# Patient Record
Sex: FEMALE | Race: OTHER | NOT HISPANIC OR LATINO
[De-identification: names, ages, dates, MRNs, and addresses within clinical notes are randomized per-mention and may not be internally consistent; named-entity substitution may affect disease eponyms.]

---

## 2017-02-25 ENCOUNTER — RX RENEWAL (OUTPATIENT)
Age: 82
End: 2017-02-25

## 2017-05-05 ENCOUNTER — APPOINTMENT (OUTPATIENT)
Dept: NEPHROLOGY | Facility: CLINIC | Age: 82
End: 2017-05-05

## 2017-05-05 VITALS — DIASTOLIC BLOOD PRESSURE: 76 MMHG | WEIGHT: 180 LBS | SYSTOLIC BLOOD PRESSURE: 142 MMHG

## 2017-07-12 ENCOUNTER — RX RENEWAL (OUTPATIENT)
Age: 82
End: 2017-07-12

## 2017-11-01 ENCOUNTER — APPOINTMENT (OUTPATIENT)
Dept: NEPHROLOGY | Facility: CLINIC | Age: 82
End: 2017-11-01
Payer: MEDICARE

## 2017-11-01 VITALS — SYSTOLIC BLOOD PRESSURE: 168 MMHG | DIASTOLIC BLOOD PRESSURE: 85 MMHG | RESPIRATION RATE: 14 BRPM | HEART RATE: 80 BPM

## 2017-11-01 VITALS — DIASTOLIC BLOOD PRESSURE: 80 MMHG | SYSTOLIC BLOOD PRESSURE: 148 MMHG

## 2017-11-01 DIAGNOSIS — Z86.39 PERSONAL HISTORY OF OTHER ENDOCRINE, NUTRITIONAL AND METABOLIC DISEASE: ICD-10-CM

## 2017-11-01 PROCEDURE — 99214 OFFICE O/P EST MOD 30 MIN: CPT

## 2017-11-15 ENCOUNTER — APPOINTMENT (OUTPATIENT)
Dept: NEPHROLOGY | Facility: CLINIC | Age: 82
End: 2017-11-15
Payer: MEDICARE

## 2017-11-15 VITALS — DIASTOLIC BLOOD PRESSURE: 78 MMHG | SYSTOLIC BLOOD PRESSURE: 138 MMHG | HEART RATE: 80 BPM

## 2017-11-15 PROCEDURE — 99214 OFFICE O/P EST MOD 30 MIN: CPT

## 2017-11-15 RX ORDER — MOMETASONE FUROATE 1 MG/G
0.1 CREAM TOPICAL
Refills: 0 | Status: ACTIVE | COMMUNITY

## 2018-04-03 ENCOUNTER — RX RENEWAL (OUTPATIENT)
Age: 83
End: 2018-04-03

## 2018-05-21 ENCOUNTER — APPOINTMENT (OUTPATIENT)
Dept: NEPHROLOGY | Facility: CLINIC | Age: 83
End: 2018-05-21
Payer: MEDICARE

## 2018-05-21 VITALS — SYSTOLIC BLOOD PRESSURE: 140 MMHG | HEART RATE: 72 BPM | DIASTOLIC BLOOD PRESSURE: 78 MMHG

## 2018-05-21 DIAGNOSIS — N18.3 CHRONIC KIDNEY DISEASE, STAGE 3 (MODERATE): ICD-10-CM

## 2018-05-21 PROCEDURE — 99214 OFFICE O/P EST MOD 30 MIN: CPT

## 2018-11-14 ENCOUNTER — APPOINTMENT (OUTPATIENT)
Dept: NEPHROLOGY | Facility: CLINIC | Age: 83
End: 2018-11-14
Payer: MEDICARE

## 2018-11-14 VITALS — SYSTOLIC BLOOD PRESSURE: 136 MMHG | DIASTOLIC BLOOD PRESSURE: 78 MMHG | HEART RATE: 68 BPM

## 2018-11-14 PROCEDURE — 99214 OFFICE O/P EST MOD 30 MIN: CPT

## 2019-02-19 ENCOUNTER — RX RENEWAL (OUTPATIENT)
Age: 84
End: 2019-02-19

## 2019-04-24 ENCOUNTER — RX RENEWAL (OUTPATIENT)
Age: 84
End: 2019-04-24

## 2019-05-22 ENCOUNTER — APPOINTMENT (OUTPATIENT)
Dept: NEPHROLOGY | Facility: CLINIC | Age: 84
End: 2019-05-22
Payer: MEDICARE

## 2019-05-22 VITALS — DIASTOLIC BLOOD PRESSURE: 60 MMHG | SYSTOLIC BLOOD PRESSURE: 138 MMHG | HEART RATE: 62 BPM

## 2019-05-22 PROCEDURE — 99214 OFFICE O/P EST MOD 30 MIN: CPT

## 2019-05-22 RX ORDER — CHOLECALCIFEROL (VITAMIN D3) 50 MCG
50 MCG TABLET ORAL
Refills: 0 | Status: ACTIVE | COMMUNITY

## 2019-05-23 NOTE — ASSESSMENT
[FreeTextEntry1] : all lab data was reviewed with patient in detail from 5/20/2019\par 82 yo woman with HTN, DMII, MGUS and CKD 4 with non nephrotic range proteinuria.\par --CKD 4-  creat stable range at 2.21\par electrolytes and volume status good\par no uremic sx, maintaining low protein diet\par -hyperkalemia-  K- 5; controlled on low K diet\par -- Microscopic hematuria and proteinuria - cont to monitor, prior,  refused biopsy  creat stabilized\par --proteinuria- Uprot/creat 2359; c/w cozaar\par --HTN - BP acceptable-\par -- DMT2-- A1C good but trending up - at 7.1%- reduce CHO intake- lose weight-\par -hyperlipidemia with elevated triglycerides- as above reduce CHO intact- lose weight- add agents to reduce if remains elevated next OV\par --MGUS- faint band IgG Kappa- stable--\par -secondary hyperparathyroidism-  PTH- 75 acceptable, Ca, P good; c/w Vit D\par \par f/u 3-4 months\par \par \par

## 2019-05-23 NOTE — PHYSICAL EXAM
[General Appearance - Alert] : alert [General Appearance - In No Acute Distress] : in no acute distress [Sclera] : the sclera and conjunctiva were normal [PERRL With Normal Accommodation] : pupils were equal in size, round, and reactive to light [Extraocular Movements] : extraocular movements were intact [Outer Ear] : the ears and nose were normal in appearance [Hearing Threshold Finger Rub Not [place]] : hearing was normal [Both Tympanic Membranes Were Examined] : both tympanic membranes were normal [Neck Appearance] : the appearance of the neck was normal [] : no respiratory distress [Respiration, Rhythm And Depth] : normal respiratory rhythm and effort [Auscultation Breath Sounds / Voice Sounds] : lungs were clear to auscultation bilaterally [Heart Rate And Rhythm] : heart rate was normal and rhythm regular [Heart Sounds] : normal S1 and S2 [Heart Sounds Gallop] : no gallops [Murmurs] : no murmurs [Heart Sounds Pericardial Friction Rub] : no pericardial rub [Full Pulse] : the pedal pulses are present [Edema] : there was no peripheral edema [Cervical Lymph Nodes Enlarged Posterior Bilaterally] : posterior cervical [Cervical Lymph Nodes Enlarged Anterior Bilaterally] : anterior cervical [Supraclavicular Lymph Nodes Enlarged Bilaterally] : supraclavicular [No CVA Tenderness] : no ~M costovertebral angle tenderness [No Spinal Tenderness] : no spinal tenderness [Abnormal Walk] : normal gait [Involuntary Movements] : no involuntary movements were seen [Musculoskeletal - Swelling] : no joint swelling seen [No Focal Deficits] : no focal deficits [Oriented To Time, Place, And Person] : oriented to person, place, and time [Impaired Insight] : insight and judgment were intact [FreeTextEntry1] : L upper quarter of face with darker pigmented non raised scars from prior herpes zoster outbreak, no open lesions

## 2019-05-23 NOTE — HISTORY OF PRESENT ILLNESS
[FreeTextEntry1] : 84 yo woman with CKD 4,  HTN, DMT2, MGUS and non nephrotic range proteinuria:here for follow up evaluation.\par No new medical events\par continues on crestor\par No flank pain, dysuria, hematuria or frothy urine\par No SOB, CP, N, V\par  no NSAIDS,\par \par

## 2019-11-06 ENCOUNTER — RECORD ABSTRACTING (OUTPATIENT)
Age: 84
End: 2019-11-06

## 2019-11-11 LAB
24R-OH-CALCIDIOL SERPL-MCNC: 17.9 PG/ML
25(OH)D3 SERPL-MCNC: 34.2 NG/ML
ALBUMIN SERPL ELPH-MCNC: 4.2 G/DL
ALP BLD-CCNC: 73 U/L
ALT SERPL-CCNC: 6 U/L
ANION GAP SERPL CALC-SCNC: 15 MMOL/L
APPEARANCE: CLEAR
AST SERPL-CCNC: 9 U/L
BACTERIA: NEGATIVE
BASOPHILS # BLD AUTO: 0.03 K/UL
BASOPHILS NFR BLD AUTO: 0.5 %
BILIRUB SERPL-MCNC: 0.3 MG/DL
BILIRUBIN URINE: NEGATIVE
BLOOD URINE: NEGATIVE
BUN SERPL-MCNC: 43 MG/DL
CALCIUM SERPL-MCNC: 10.1 MG/DL
CALCIUM SERPL-MCNC: 10.1 MG/DL
CHLORIDE SERPL-SCNC: 105 MMOL/L
CHOLEST SERPL-MCNC: 148 MG/DL
CHOLEST/HDLC SERPL: 3.6 RATIO
CO2 SERPL-SCNC: 24 MMOL/L
COLOR: NORMAL
CREAT SERPL-MCNC: 2.18 MG/DL
CREAT SPEC-SCNC: 72 MG/DL
CREAT SPEC-SCNC: 72 MG/DL
CREAT/PROT UR: 2 RATIO
EOSINOPHIL # BLD AUTO: 0.28 K/UL
EOSINOPHIL NFR BLD AUTO: 4.4 %
ESTIMATED AVERAGE GLUCOSE: 148 MG/DL
GLUCOSE QUALITATIVE U: NEGATIVE
GLUCOSE SERPL-MCNC: 151 MG/DL
HBA1C MFR BLD HPLC: 6.8 %
HCT VFR BLD CALC: 38.6 %
HDLC SERPL-MCNC: 41 MG/DL
HGB BLD-MCNC: 11.6 G/DL
HYALINE CASTS: 0 /LPF
IMM GRANULOCYTES NFR BLD AUTO: 0.3 %
KETONES URINE: NEGATIVE
LDLC SERPL CALC-MCNC: 74 MG/DL
LEUKOCYTE ESTERASE URINE: NEGATIVE
LYMPHOCYTES # BLD AUTO: 1.71 K/UL
LYMPHOCYTES NFR BLD AUTO: 26.9 %
MAGNESIUM SERPL-MCNC: 2.1 MG/DL
MAN DIFF?: NORMAL
MCHC RBC-ENTMCNC: 28.8 PG
MCHC RBC-ENTMCNC: 30.1 GM/DL
MCV RBC AUTO: 95.8 FL
MICROALBUMIN 24H UR DL<=1MG/L-MCNC: 90.8 MG/DL
MICROALBUMIN/CREAT 24H UR-RTO: 1224 MG/G
MICROSCOPIC-UA: NORMAL
MONOCYTES # BLD AUTO: 0.58 K/UL
MONOCYTES NFR BLD AUTO: 9.1 %
NEUTROPHILS # BLD AUTO: 3.74 K/UL
NEUTROPHILS NFR BLD AUTO: 58.8 %
NITRITE URINE: NEGATIVE
PARATHYROID HORMONE INTACT: 54 PG/ML
PH URINE: 6.5
PHOSPHATE SERPL-MCNC: 4.5 MG/DL
PLATELET # BLD AUTO: 353 K/UL
POTASSIUM SERPL-SCNC: 5.2 MMOL/L
PROT SERPL-MCNC: 7.3 G/DL
PROT UR-MCNC: 147 MG/DL
PROTEIN URINE: ABNORMAL
RBC # BLD: 4.03 M/UL
RBC # FLD: 12.7 %
RED BLOOD CELLS URINE: 3 /HPF
SODIUM SERPL-SCNC: 143 MMOL/L
SPECIFIC GRAVITY URINE: 1.01
SQUAMOUS EPITHELIAL CELLS: 1 /HPF
TRIGL SERPL-MCNC: 167 MG/DL
URATE SERPL-MCNC: 5.7 MG/DL
UROBILINOGEN URINE: NORMAL
WBC # FLD AUTO: 6.36 K/UL
WHITE BLOOD CELLS URINE: 2 /HPF

## 2019-11-13 ENCOUNTER — APPOINTMENT (OUTPATIENT)
Dept: NEPHROLOGY | Facility: CLINIC | Age: 84
End: 2019-11-13
Payer: MEDICARE

## 2019-11-13 VITALS — SYSTOLIC BLOOD PRESSURE: 142 MMHG | DIASTOLIC BLOOD PRESSURE: 76 MMHG | HEART RATE: 70 BPM

## 2019-11-13 PROCEDURE — 99214 OFFICE O/P EST MOD 30 MIN: CPT

## 2019-11-15 NOTE — PHYSICAL EXAM
[General Appearance - Alert] : alert [PERRL With Normal Accommodation] : pupils were equal in size, round, and reactive to light [Sclera] : the sclera and conjunctiva were normal [General Appearance - In No Acute Distress] : in no acute distress [Extraocular Movements] : extraocular movements were intact [Outer Ear] : the ears and nose were normal in appearance [Hearing Threshold Finger Rub Not [place]] : hearing was normal [Both Tympanic Membranes Were Examined] : both tympanic membranes were normal [Neck Appearance] : the appearance of the neck was normal [] : no respiratory distress [Respiration, Rhythm And Depth] : normal respiratory rhythm and effort [Auscultation Breath Sounds / Voice Sounds] : lungs were clear to auscultation bilaterally [Heart Sounds] : normal S1 and S2 [Heart Rate And Rhythm] : heart rate was normal and rhythm regular [Murmurs] : no murmurs [Heart Sounds Gallop] : no gallops [Heart Sounds Pericardial Friction Rub] : no pericardial rub [Full Pulse] : the pedal pulses are present [Cervical Lymph Nodes Enlarged Posterior Bilaterally] : posterior cervical [Edema] : there was no peripheral edema [Cervical Lymph Nodes Enlarged Anterior Bilaterally] : anterior cervical [Supraclavicular Lymph Nodes Enlarged Bilaterally] : supraclavicular [No Spinal Tenderness] : no spinal tenderness [No CVA Tenderness] : no ~M costovertebral angle tenderness [Abnormal Walk] : normal gait [Musculoskeletal - Swelling] : no joint swelling seen [Involuntary Movements] : no involuntary movements were seen [No Focal Deficits] : no focal deficits [Oriented To Time, Place, And Person] : oriented to person, place, and time [Impaired Insight] : insight and judgment were intact [FreeTextEntry1] : L upper quarter of face with darker pigmented non raised scars from prior herpes zoster outbreak, no open lesions

## 2019-11-15 NOTE — HISTORY OF PRESENT ILLNESS
[FreeTextEntry1] : 85 yo woman here for f/u evaluation of CKD 4,  HTN, DMT2, MGUS and non nephrotic range proteinuria.\par Just back from [place] 2 weeks ago- \par has developed some GI reflux\par tolerating crestor\par No flank pain, dysuria, hematuria or frothy urine\par No SOB, CP, N, V\par  no NSAIDS,\par \par

## 2019-11-15 NOTE — ASSESSMENT
[FreeTextEntry1] : all lab data was reviewed with patient in detail from 11/9/2019\par 84 yo woman with HTN, DMII, MGUS and CKD 4 with non nephrotic range proteinuria.\par --CKD 4-  creat 2.18- generally stable\par no uremic sx, maintaining low protein diet\par -hyperkalemia-  K- 5.2; if rises will add veltassa. c/w low K diet\par -- Microscopic hematuria- repeat u/a without blood \par --proteinuria- Uprot/creat 2- stable ; c/w cozaar\par --HTN - BP acceptable-\par -- DMT2-- A1C down to 6.8%- good-\par -hyperlipidemia  improving- c/w crestor\par - MGUS- faint band IgG Kappa- stable--\par -secondary hyperparathyroidism- controlled with Vit D-   PTH-  54 from 75 \par \par f/u 3-4 months\par \par \par

## 2020-04-06 ENCOUNTER — RX RENEWAL (OUTPATIENT)
Age: 85
End: 2020-04-06

## 2020-04-10 ENCOUNTER — RX RENEWAL (OUTPATIENT)
Age: 85
End: 2020-04-10

## 2020-10-01 ENCOUNTER — APPOINTMENT (OUTPATIENT)
Dept: NEPHROLOGY | Facility: CLINIC | Age: 85
End: 2020-10-01
Payer: MEDICARE

## 2020-10-01 PROCEDURE — 99214 OFFICE O/P EST MOD 30 MIN: CPT

## 2020-10-01 RX ORDER — MULTIVITAMIN
TABLET ORAL
Refills: 0 | Status: ACTIVE | COMMUNITY

## 2020-10-01 RX ORDER — CALCITRIOL 0.25 UG/1
0.25 CAPSULE, LIQUID FILLED ORAL
Qty: 90 | Refills: 3 | Status: DISCONTINUED | COMMUNITY
Start: 2019-11-13 | End: 2020-10-01

## 2020-10-02 NOTE — PHYSICAL EXAM
[General Appearance - Alert] : alert [General Appearance - In No Acute Distress] : in no acute distress [Sclera] : the sclera and conjunctiva were normal [PERRL With Normal Accommodation] : pupils were equal in size, round, and reactive to light [Extraocular Movements] : extraocular movements were intact [Outer Ear] : the ears and nose were normal in appearance [Hearing Threshold Finger Rub Not [place]] : hearing was normal [Both Tympanic Membranes Were Examined] : both tympanic membranes were normal [Neck Appearance] : the appearance of the neck was normal [] : no respiratory distress [Respiration, Rhythm And Depth] : normal respiratory rhythm and effort [Auscultation Breath Sounds / Voice Sounds] : lungs were clear to auscultation bilaterally [Heart Rate And Rhythm] : heart rate was normal and rhythm regular [Heart Sounds] : normal S1 and S2 [Heart Sounds Gallop] : no gallops [Murmurs] : no murmurs [Heart Sounds Pericardial Friction Rub] : no pericardial rub [Full Pulse] : the pedal pulses are present [Edema] : there was no peripheral edema [Cervical Lymph Nodes Enlarged Posterior Bilaterally] : posterior cervical [Cervical Lymph Nodes Enlarged Anterior Bilaterally] : anterior cervical [Supraclavicular Lymph Nodes Enlarged Bilaterally] : supraclavicular [No CVA Tenderness] : no ~M costovertebral angle tenderness [No Spinal Tenderness] : no spinal tenderness [Abnormal Walk] : normal gait [Involuntary Movements] : no involuntary movements were seen [Musculoskeletal - Swelling] : no joint swelling seen [No Focal Deficits] : no focal deficits [Oriented To Time, Place, And Person] : oriented to person, place, and time [Impaired Insight] : insight and judgment were intact [FreeTextEntry1] : L upper quarter of face with darker pigmented non raised scars from prior herpes zoster outbreak, no open lesions

## 2020-10-02 NOTE — HISTORY OF PRESENT ILLNESS
[FreeTextEntry1] : 86 yo woman with CKD 4,  HTN, DMT2, MGUS and non nephrotic range proteinuria, here for f/u evaluation.\par Was not able to get to [facility] due to COVID pandemic\par feels somewhat stressed but coping\par no change to medications.\par Denies flank pain, dysuria, hematuria or frothy urine \par No CP, SOB, \par reflux symptoms settled down\par tolerating crestor\par \par \par \par

## 2020-10-02 NOTE — ASSESSMENT
[FreeTextEntry1] : all lab data was reviewed with patient in detail from 9/28/2020\par 86 yo woman with HTN, DM2, MGUS and CKD 4 with non nephrotic range proteinuria.\par --CKD 4-  creat 2.47- up again.\par no uremic sx, maintaining low protein diet;\par No NSAID or PPI\par trial of lower losartan and change atenolol to carvedilol - titrate as needed\par -hyperkalemia-  diet controlled- K 4.6- defer potassium lowering agent. c/w low K diet\par -- Microscopic hematuria- repeat u/a without blood monitor\par --proteinuria- Uprot/creat subnephrotic, stable ;  as above will need to follow proteinuria trend on lower dose ARB\par --HTN - BP above goal- adjusting meds as above\par -- DMT2-- A1C stable at 6.8%\par -hyperlipidemia- c/w crestor\par - MGUS- faint band IgG Kappa- stable--\par -secondary hyperparathyroidism- controlled with Vit D\par \par f/u 3 months\par \par \par

## 2020-10-12 ENCOUNTER — APPOINTMENT (OUTPATIENT)
Dept: NEPHROLOGY | Facility: CLINIC | Age: 85
End: 2020-10-12
Payer: MEDICARE

## 2020-10-12 DIAGNOSIS — Z23 ENCOUNTER FOR IMMUNIZATION: ICD-10-CM

## 2020-10-12 PROCEDURE — 90662 IIV NO PRSV INCREASED AG IM: CPT

## 2020-10-12 PROCEDURE — G0008: CPT

## 2020-10-12 NOTE — PROCEDURE
[FreeTextEntry1] : flu shot given\par Fluzone high-dose\par left deltoid, IM 0.7ml\par NDC 13015-760-06\par lot: [id number]\par exp: [id number]

## 2020-12-07 ENCOUNTER — APPOINTMENT (OUTPATIENT)
Dept: NEPHROLOGY | Facility: CLINIC | Age: 85
End: 2020-12-07
Payer: MEDICARE

## 2020-12-07 VITALS — SYSTOLIC BLOOD PRESSURE: 152 MMHG | HEART RATE: 88 BPM | DIASTOLIC BLOOD PRESSURE: 80 MMHG

## 2020-12-07 PROCEDURE — 99214 OFFICE O/P EST MOD 30 MIN: CPT

## 2020-12-07 RX ORDER — BIMATOPROST 0.1 MG/ML
0.01 SOLUTION/ DROPS OPHTHALMIC
Qty: 8 | Refills: 0 | Status: DISCONTINUED | COMMUNITY
Start: 2020-10-27

## 2020-12-07 RX ORDER — FLUOROMETHOLONE 1 MG/ML
0.1 SOLUTION/ DROPS OPHTHALMIC
Qty: 5 | Refills: 0 | Status: DISCONTINUED | COMMUNITY
Start: 2020-06-18

## 2020-12-07 RX ORDER — DORZOLAMIDE HYDROCHLORIDE AND TIMOLOL MALEATE PRESERVATIVE FREE 20; 5 MG/ML; MG/ML
2-0.5 SOLUTION/ DROPS OPHTHALMIC
Qty: 60 | Refills: 0 | Status: DISCONTINUED | COMMUNITY
Start: 2020-06-18

## 2020-12-07 RX ORDER — LOSARTAN POTASSIUM 100 MG/1
100 TABLET, FILM COATED ORAL
Qty: 30 | Refills: 0 | Status: DISCONTINUED | COMMUNITY
Start: 2020-03-04

## 2020-12-07 RX ORDER — LOTEPREDNOL ETABONATE 3.8 MG/G
0.38 GEL OPHTHALMIC
Qty: 5 | Refills: 0 | Status: DISCONTINUED | COMMUNITY
Start: 2020-10-09

## 2020-12-08 NOTE — PHYSICAL EXAM
[General Appearance - Alert] : alert [General Appearance - In No Acute Distress] : in no acute distress [Sclera] : the sclera and conjunctiva were normal [PERRL With Normal Accommodation] : pupils were equal in size, round, and reactive to light [Extraocular Movements] : extraocular movements were intact [Outer Ear] : the ears and nose were normal in appearance [Hearing Threshold Finger Rub Not [place]] : hearing was normal [Both Tympanic Membranes Were Examined] : both tympanic membranes were normal [Neck Appearance] : the appearance of the neck was normal [] : no respiratory distress [Respiration, Rhythm And Depth] : normal respiratory rhythm and effort [Auscultation Breath Sounds / Voice Sounds] : lungs were clear to auscultation bilaterally [Heart Rate And Rhythm] : heart rate was normal and rhythm regular [Heart Sounds] : normal S1 and S2 [Heart Sounds Gallop] : no gallops [Murmurs] : no murmurs [Heart Sounds Pericardial Friction Rub] : no pericardial rub [Full Pulse] : the pedal pulses are present [Edema] : there was no peripheral edema [Cervical Lymph Nodes Enlarged Posterior Bilaterally] : posterior cervical [Cervical Lymph Nodes Enlarged Anterior Bilaterally] : anterior cervical [Supraclavicular Lymph Nodes Enlarged Bilaterally] : supraclavicular [No CVA Tenderness] : no ~M costovertebral angle tenderness [No Spinal Tenderness] : no spinal tenderness [Abnormal Walk] : normal gait [Involuntary Movements] : no involuntary movements were seen [Musculoskeletal - Swelling] : no joint swelling seen [No Focal Deficits] : no focal deficits [Oriented To Time, Place, And Person] : oriented to person, place, and time [Impaired Insight] : insight and judgment were intact [FreeTextEntry1] : L upper quarter of face with darker pigmented non raised scars from prior herpes zoster outbreak, no open lesions

## 2020-12-08 NOTE — ASSESSMENT
[FreeTextEntry1] : for new labs this week\par 86 yo woman with HTN, DM2, MGUS and CKD 4 with non nephrotic range proteinuria.\par --CKD 4- clinically stable with no signs of uremia\par maintaining low protein diet;\par No NSAID or PPI\par -proteinuria- c/w losartan\par but if rising creat consider lower losartan \par -hyperkalemia-  diet controlled- add potassium lowering agents as needed\par -- Microscopic hematuria- repeat u/a without blood- monitor\par --HTN - BP above goal- but very upset today- to obtain repeat BP measurements by PCP\par consider ABPM\par  -- DMT2-- A1C stable \par -hyperlipidemia- c/w crestor\par - MGUS- faint band IgG Kappa- stable--\par -secondary hyperparathyroidism- controlled with Vit D\par \par f/u 3 months\par \par \par  [id number]

## 2020-12-08 NOTE — HISTORY OF PRESENT ILLNESS
[FreeTextEntry1] : 86 yo woman here for f/u evaluation of CKD 4,  HTN, DMT2, MGUS and non nephrotic range proteinuria.\par close friend  suddenly last week (was recovering from a THR in [place]) very upset\par sheltering at home\par no change to medications.\par Denies flank pain, dysuria, hematuria or frothy urine \par No CP, SOB, \par \par \par \par \par \par

## 2021-01-14 ENCOUNTER — APPOINTMENT (OUTPATIENT)
Dept: NEPHROLOGY | Facility: CLINIC | Age: 86
End: 2021-01-14
Payer: MEDICARE

## 2021-01-14 VITALS — HEART RATE: 86 BPM | SYSTOLIC BLOOD PRESSURE: 147 MMHG | DIASTOLIC BLOOD PRESSURE: 77 MMHG

## 2021-01-14 PROCEDURE — 99214 OFFICE O/P EST MOD 30 MIN: CPT

## 2021-01-15 NOTE — ASSESSMENT
[FreeTextEntry1] : 1/11/2021\par 84 yo woman with HTN, DM2, MGUS and CKD 4 with non nephrotic range proteinuria.\par --CKD 4-  creatinine 2.38- down a bit- generally stable- no signs of uremia.Aware of potential need for RRT in future\par briefly discussed HD\par maintaining low protein diet;\par No NSAID or PPI\par -proteinuria- c/w losartan\par but if rising creat consider lower losartan \par -hyperkalemia-  K 4.4 diet controlled- add potassium lowering agents as needed\par -- Microscopic hematuria- resolved\par --HTN - sBP  down to 147- offered tht she take diuretic for few days to help with ankle edema and BP- she declines for now- says it gets better after a few days\par  -- DMT2-- A1C stable \par -hyperlipidemia-  LP controlled- c/w crestor\par - MGUS- faint band IgG Kappa- stable--\par -secondary hyperparathyroidism-  PTH controlled- c/w Vit D\par -anemia- hgb 11.8- stable from chronic disease\par \par \par f/u 3-4  months\par traveling to [facility] in May!\par \par \par

## 2021-01-15 NOTE — PHYSICAL EXAM
[General Appearance - Alert] : alert [General Appearance - In No Acute Distress] : in no acute distress [Sclera] : the sclera and conjunctiva were normal [PERRL With Normal Accommodation] : pupils were equal in size, round, and reactive to light [Extraocular Movements] : extraocular movements were intact [Outer Ear] : the ears and nose were normal in appearance [Hearing Threshold Finger Rub Not [place]] : hearing was normal [Both Tympanic Membranes Were Examined] : both tympanic membranes were normal [Neck Appearance] : the appearance of the neck was normal [] : no respiratory distress [Respiration, Rhythm And Depth] : normal respiratory rhythm and effort [Auscultation Breath Sounds / Voice Sounds] : lungs were clear to auscultation bilaterally [Heart Rate And Rhythm] : heart rate was normal and rhythm regular [Heart Sounds] : normal S1 and S2 [Heart Sounds Gallop] : no gallops [Murmurs] : no murmurs [Heart Sounds Pericardial Friction Rub] : no pericardial rub [Full Pulse] : the pedal pulses are present [Cervical Lymph Nodes Enlarged Posterior Bilaterally] : posterior cervical [Cervical Lymph Nodes Enlarged Anterior Bilaterally] : anterior cervical [Supraclavicular Lymph Nodes Enlarged Bilaterally] : supraclavicular [No CVA Tenderness] : no ~M costovertebral angle tenderness [No Spinal Tenderness] : no spinal tenderness [Abnormal Walk] : normal gait [Involuntary Movements] : no involuntary movements were seen [Musculoskeletal - Swelling] : no joint swelling seen [No Focal Deficits] : no focal deficits [Oriented To Time, Place, And Person] : oriented to person, place, and time [Impaired Insight] : insight and judgment were intact [FreeTextEntry1] : L upper quarter of face with darker pigmented non raised scars from prior herpes zoster outbreak, no open lesions

## 2021-01-15 NOTE — HISTORY OF PRESENT ILLNESS
[FreeTextEntry1] : 84 yo woman with CKD 4,  HTN, DMT2, MGUS and non nephrotic range proteinuria, here for f/u evaluation.\par still sad about the sudden loss of her friend; and not being able to kiss and hug her grandchildren\par appetite good; goes out daily for walks\par Denies flank pain, dysuria, hematuria or frothy urine \par No CP, SOB, \par \par \par \par \par \par

## 2021-05-06 ENCOUNTER — RX RENEWAL (OUTPATIENT)
Age: 86
End: 2021-05-06

## 2021-05-26 ENCOUNTER — APPOINTMENT (OUTPATIENT)
Dept: NEPHROLOGY | Facility: CLINIC | Age: 86
End: 2021-05-26
Payer: MEDICARE

## 2021-05-26 VITALS — HEART RATE: 86 BPM | DIASTOLIC BLOOD PRESSURE: 82 MMHG | SYSTOLIC BLOOD PRESSURE: 144 MMHG

## 2021-05-26 PROCEDURE — 99214 OFFICE O/P EST MOD 30 MIN: CPT

## 2021-05-26 NOTE — HISTORY OF PRESENT ILLNESS
[FreeTextEntry1] : 84 yo woman here for f/u evaluation of CKD 4,  HTN, DMT2, MGUS and non nephrotic range proteinuria.\par Vaccinated against COVID\par traveling to [facility] early July\par Just returned from [employer]- grandson\par appetite good; goes out daily for walks; No uremic symptoms\par Denies flank pain, dysuria, hematuria or frothy urine \par No CP, SOB, \par \par \par \par \par \par

## 2021-05-26 NOTE — PHYSICAL EXAM
[General Appearance - Alert] : alert [General Appearance - In No Acute Distress] : in no acute distress [] : no respiratory distress [Respiration, Rhythm And Depth] : normal respiratory rhythm and effort [Auscultation Breath Sounds / Voice Sounds] : lungs were clear to auscultation bilaterally [Heart Sounds] : normal S1 and S2 [Heart Rate And Rhythm] : heart rate was normal and rhythm regular [Heart Sounds Gallop] : no gallops [Murmurs] : no murmurs [Heart Sounds Pericardial Friction Rub] : no pericardial rub [Full Pulse] : the pedal pulses are present [Oriented To Time, Place, And Person] : oriented to person, place, and time [Impaired Insight] : insight and judgment were intact [FreeTextEntry1] : tr-1+ edema L>R ankle

## 2021-11-23 ENCOUNTER — RX RENEWAL (OUTPATIENT)
Age: 86
End: 2021-11-23

## 2021-11-29 ENCOUNTER — RX RENEWAL (OUTPATIENT)
Age: 86
End: 2021-11-29

## 2021-12-08 ENCOUNTER — APPOINTMENT (OUTPATIENT)
Dept: NEPHROLOGY | Facility: CLINIC | Age: 86
End: 2021-12-08
Payer: MEDICARE

## 2021-12-08 VITALS — SYSTOLIC BLOOD PRESSURE: 134 MMHG | HEART RATE: 79 BPM | OXYGEN SATURATION: 97 % | DIASTOLIC BLOOD PRESSURE: 73 MMHG

## 2021-12-08 PROCEDURE — 99214 OFFICE O/P EST MOD 30 MIN: CPT

## 2021-12-08 NOTE — PHYSICAL EXAM
[General Appearance - Alert] : alert [General Appearance - In No Acute Distress] : in no acute distress [] : no respiratory distress [Respiration, Rhythm And Depth] : normal respiratory rhythm and effort [Auscultation Breath Sounds / Voice Sounds] : lungs were clear to auscultation bilaterally [Heart Rate And Rhythm] : heart rate was normal and rhythm regular [Heart Sounds] : normal S1 and S2 [Heart Sounds Gallop] : no gallops [Murmurs] : no murmurs [Heart Sounds Pericardial Friction Rub] : no pericardial rub [Full Pulse] : the pedal pulses are present [Oriented To Time, Place, And Person] : oriented to person, place, and time [Impaired Insight] : insight and judgment were intact [FreeTextEntry1] : tr ankle edema

## 2021-12-08 NOTE — HISTORY OF PRESENT ILLNESS
[FreeTextEntry1] : 87 yo woman with CKD 4,  HTN, DMT2, MGUS and non nephrotic range proteinuria, here for follow up evaluation.\par just back from 3 months in [facility]- had a wonderful time.\par energy and appetite very good\par daily walks; No uremic symptoms\par Denies flank pain, dysuria, hematuria or frothy urine \par No CP, SOB, \par \par \par \par \par \par

## 2021-12-08 NOTE — ASSESSMENT
[FreeTextEntry1] : all lab data was reviewed with patient in detail from 12/3/2021\par 87 yo woman with HTN, DM2, MGUS and CKD 4 with non nephrotic range proteinuria.\par --CKD 4-  creatinine 2.43- stable; electrolytes good. avoiding NSAIDS, limiting protein intake; not uremic\par Aware of potential need for RRT.\par -proteinuria- c/w losartan\par -hyperkalemia-  controlled on low K diet\par --HTN -  BP controlled; maintain low salt intake\par  -- DMT2-- A1C very good 5.7%!\par -hyperlipidemia-  LP controlled- c/w crestor\par - MGUS- faint band IgG Kappa- stable--\par -secondary hyperparathyroidism-  PTH 91 acceptable- c/w Vit D\par -anemia- hgb up 11.3-  using Fe intermittently- daily causes constipation- okay\par no indication for ALEXIS .\par \par \par f/u 4 months\par \par \par \par \par \par \par

## 2022-02-04 ENCOUNTER — RX RENEWAL (OUTPATIENT)
Age: 87
End: 2022-02-04

## 2022-04-27 ENCOUNTER — APPOINTMENT (OUTPATIENT)
Dept: NEPHROLOGY | Facility: CLINIC | Age: 87
End: 2022-04-27
Payer: MEDICARE

## 2022-04-27 VITALS — HEART RATE: 69 BPM | DIASTOLIC BLOOD PRESSURE: 66 MMHG | SYSTOLIC BLOOD PRESSURE: 115 MMHG | OXYGEN SATURATION: 98 %

## 2022-04-27 DIAGNOSIS — E55.9 VITAMIN D DEFICIENCY, UNSPECIFIED: ICD-10-CM

## 2022-04-27 PROCEDURE — 99214 OFFICE O/P EST MOD 30 MIN: CPT

## 2022-04-27 NOTE — ASSESSMENT
[FreeTextEntry1] : all lab data was reviewed with patient in detail from 4/25/2022\par 87 yo woman with HTN, DM2, MGUS and CKD 4 with non nephrotic range proteinuria.\par --HTN -  BP on low side- reduced losartan to 25 mg daily\par --CKD 4-  creatinine  up to 2.9;  eGFR 12 by cystatin c (CKD 5); BP on low side - reduce losartan to 25 mg- avoiding NSAIDS, limiting protein intake; not uremic\par rediscussed future need for RRT- PD/ HD/ Home hemo -thinks she would prefer in center hemo.\par -proteinuria- c/w losartan- reduced to 25 mg\par -hyperkalemia- K 5.1  controlled on low K diet\par  -- DMT2-- A1C acceptable \par -hyperlipidemia-  LP controlled- c/w crestor\par - MGUS- faint band IgG Kappa- stable--\par -secondary hyperparathyroidism-  PTH 84- good- c/w Vit D\par -anemia- hgb  dip to 10.8 from 11.3- TSat 28%- good;  no indication for ESAs yet\par -hyperphosphatemia- P 4.7- limit P rich foods; defer binders\par \par f/u when back from [place]- Nov- \par may benefit from OV while in [place]\par \par \par \par \par \par

## 2022-04-27 NOTE — HISTORY OF PRESENT ILLNESS
[FreeTextEntry1] : 87 yo woman here for f/u evaluation of CKD 4,  HTN, DMT2, MGUS and non nephrotic range proteinuria.\par feels well\par no change to medications\par energy and appetite very good\par daily walks; No uremic symptoms\par Denies flank pain, dysuria, hematuria or frothy urine \par No CP, SOB, \par leaving for [facility] in June- back in Nov\par \par \par \par \par \par

## 2022-11-07 ENCOUNTER — APPOINTMENT (OUTPATIENT)
Dept: NEPHROLOGY | Facility: CLINIC | Age: 87
End: 2022-11-07

## 2022-11-07 VITALS — HEART RATE: 78 BPM | OXYGEN SATURATION: 97 % | SYSTOLIC BLOOD PRESSURE: 94 MMHG | DIASTOLIC BLOOD PRESSURE: 56 MMHG

## 2022-11-07 PROCEDURE — 99214 OFFICE O/P EST MOD 30 MIN: CPT

## 2022-11-07 RX ORDER — NIFEDIPINE 60 MG/1
60 TABLET, EXTENDED RELEASE ORAL
Qty: 180 | Refills: 3 | Status: DISCONTINUED | COMMUNITY
Start: 2018-04-03 | End: 2022-11-07

## 2022-11-07 RX ORDER — DORZOLAMIDE HYDROCHLORIDE TIMOLOL MALEATE 20; 5 MG/ML; MG/ML
22.3-6.8 SOLUTION/ DROPS OPHTHALMIC
Qty: 10 | Refills: 0 | Status: ACTIVE | COMMUNITY
Start: 2022-04-04

## 2022-11-07 NOTE — ASSESSMENT
[FreeTextEntry1] : all lab data was reviewed with patient in detail from 11/3/2022\par 88 yo woman with HTN, DM2, MGUS and CKD 4 with non nephrotic range proteinuria. Good weight loss while in [place]- encouraged to not regain back here in [facility]\par --HTN -  BP too low- instructed to dc nifedipine and hold losartan\par --CKD 4-  creatinine increased again to 3.4-  BP again too low- as above hold losartan, dc nifedipine\par repeat labs 1 week\par progression v. low perfusion\par avoiding NSAIDS, limiting protein intake; not uremic\par rediscussed future need for RRT- PD/ HD/ Home hemo - she would prefer in center hemo.\par -proteinuria- hold losartan until BP better\par -hyperkalemia- controlled with diet K 4.6\par  -- DMT2-- A1C good\par -hyperlipidemia-  LP at goal-  c/w crestor\par - MGUS- faint band IgG Kappa- stable--\par -secondary hyperparathyroidism- -  c/w Vit D\par -anemia- hgb 11.2- good;  no indication for ESAs yet\par -hyperphosphatemia- P 4.6- limit P rich foods; defer binders\par \par repeat labs 1 week- telephonic OV then to discuss results\par OV here 3 months\par \par \par \par \par \par

## 2022-11-07 NOTE — HISTORY OF PRESENT ILLNESS
[FreeTextEntry1] : 86 yo woman with CKD 4,  HTN, DMT2, MGUS and non nephrotic range proteinuria, for follow up evaluation\par Just back from [place] last week.\par Weight down to 163- "good eating and swimming"\par last OV BP was on low side- instructed to reduce losartan to 25 mg daily- has done\par energy and appetite good\par daily walks; No uremic symptoms\par Denies flank pain, dysuria, hematuria or frothy urine \par No CP, SOB, \par \par \par \par \par \par \par

## 2023-01-02 ENCOUNTER — RX RENEWAL (OUTPATIENT)
Age: 88
End: 2023-01-02

## 2023-02-06 ENCOUNTER — APPOINTMENT (OUTPATIENT)
Dept: NEPHROLOGY | Facility: CLINIC | Age: 88
End: 2023-02-06
Payer: MEDICARE

## 2023-02-06 VITALS — SYSTOLIC BLOOD PRESSURE: 150 MMHG | WEIGHT: 154 LBS | DIASTOLIC BLOOD PRESSURE: 86 MMHG | HEART RATE: 76 BPM

## 2023-02-06 PROCEDURE — 99214 OFFICE O/P EST MOD 30 MIN: CPT

## 2023-02-06 RX ORDER — LEVOTHYROXINE SODIUM 0.09 MG/1
88 TABLET ORAL
Qty: 90 | Refills: 0 | Status: ACTIVE | COMMUNITY
Start: 2023-01-02

## 2023-02-06 RX ORDER — BLOOD SUGAR DIAGNOSTIC
100 STRIP MISCELLANEOUS
Refills: 0 | Status: DISCONTINUED | COMMUNITY
End: 2023-02-06

## 2023-02-06 RX ORDER — NIFEDIPINE 60 MG/1
60 TABLET, EXTENDED RELEASE ORAL
Qty: 180 | Refills: 3 | Status: DISCONTINUED | COMMUNITY
Start: 2017-02-25 | End: 2023-02-06

## 2023-02-06 NOTE — ASSESSMENT
[FreeTextEntry1] : all lab data was reviewed with patient in detail from 2/2/2023\par 86 yo woman with HTN, DM2, MGUS and CKD 4 with non nephrotic range proteinuria. \par --HTN -  BP above goal here today- instructed to forward home Bp readings- measure daily x 1 week\par then every Sun, Mon, Tues for about a month\par may need to restart low dose nifedipine- \par she says she won't start anything- "I feel too good"\par will monitor and determine best path forward as we try to optimize BP and delay progression of CKD\par --CKD 4-  creatinine back down to 2.74- from better renal perfusion and drop of RASi-\par monitor; no not uremic symptoms or signs\par  indication for RRT at this time\par avoiding NSAIDS, limiting protein intake; \par she is aware of future need for RRT- has verbalized that she would prefer in center hem.\par -proteinuria- continue to hold losartan\par -hyperkalemia- K 4.8; controlled with restricted K diet\par  -- DMT2-- A1C good\par -hyperlipidemia-  LP controlled;   c/w crestor and keeping weight down\par - MGUS- faint band IgG Kappa- stable--\par -secondary hyperparathyroidism- -  c/w Vit D\par -anemia- hgb 10.t from 11.2-  declines Fe tabs- will repeat data with Fe indices for next OV\par asymptomatic; no evidence for GI blood loss\par no indication for ESAs yet\par -hyperphosphatemia-  limiting P rich foods; defer binders\par \par \par OV here 3-4 months\par prior to returning to [facility] in June.\par \par \par \par \par

## 2023-02-06 NOTE — PHYSICAL EXAM
[General Appearance - Alert] : alert [General Appearance - In No Acute Distress] : in no acute distress [] : no respiratory distress [Respiration, Rhythm And Depth] : normal respiratory rhythm and effort [Auscultation Breath Sounds / Voice Sounds] : lungs were clear to auscultation bilaterally [Heart Rate And Rhythm] : heart rate was normal and rhythm regular [Heart Sounds] : normal S1 and S2 [Heart Sounds Gallop] : no gallops [Murmurs] : no murmurs [Heart Sounds Pericardial Friction Rub] : no pericardial rub [Full Pulse] : the pedal pulses are present [Oriented To Time, Place, And Person] : oriented to person, place, and time [Impaired Insight] : insight and judgment were intact [Edema] : there was no peripheral edema [No CVA Tenderness] : no ~M costovertebral angle tenderness

## 2023-02-06 NOTE — HISTORY OF PRESENT ILLNESS
[FreeTextEntry1] : 86 yo woman here for f/u evaluation of CKD 4,  HTN, DMT2, MGUS and non nephrotic range proteinuria.\par weight down to 154- very happy with this drop- appetite good, but has been able to control- mostly only 2 meals daily,\par reports that she as been feeling excellent since dc of BP meds last OV-started right after we stopped her nifedipine and losartan for hypotension last OV\par reports that home BPs running 120-130 range- occ >140\par returning to [facility] in June\par energy and appetite good\par daily walks; No uremic symptoms\par Denies flank pain, dysuria, hematuria or frothy urine \par No CP, SOB, \par \par \par \par \par \par \par

## 2023-05-05 ENCOUNTER — EMERGENCY (EMERGENCY)
Facility: HOSPITAL | Age: 88
LOS: 1 days | Discharge: HOME | End: 2023-05-05
Attending: STUDENT IN AN ORGANIZED HEALTH CARE EDUCATION/TRAINING PROGRAM | Admitting: STUDENT IN AN ORGANIZED HEALTH CARE EDUCATION/TRAINING PROGRAM
Payer: MEDICARE

## 2023-05-05 VITALS
TEMPERATURE: 98 F | OXYGEN SATURATION: 94 % | RESPIRATION RATE: 18 BRPM | SYSTOLIC BLOOD PRESSURE: 156 MMHG | WEIGHT: 149.91 LBS | HEART RATE: 87 BPM | DIASTOLIC BLOOD PRESSURE: 90 MMHG

## 2023-05-05 DIAGNOSIS — R10.9 UNSPECIFIED ABDOMINAL PAIN: ICD-10-CM

## 2023-05-05 DIAGNOSIS — S00.31XA ABRASION OF NOSE, INITIAL ENCOUNTER: ICD-10-CM

## 2023-05-05 DIAGNOSIS — I10 ESSENTIAL (PRIMARY) HYPERTENSION: ICD-10-CM

## 2023-05-05 DIAGNOSIS — Z23 ENCOUNTER FOR IMMUNIZATION: ICD-10-CM
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
## 2023-05-05 DIAGNOSIS — S00.81XA ABRASION OF OTHER PART OF HEAD, INITIAL ENCOUNTER: ICD-10-CM

## 2023-05-05 DIAGNOSIS — E11.9 TYPE 2 DIABETES MELLITUS WITHOUT COMPLICATIONS: ICD-10-CM

## 2023-05-05 PROCEDURE — 99283 EMERGENCY DEPT VISIT LOW MDM: CPT | Mod: 25

## 2023-05-05 PROCEDURE — 90715 TDAP VACCINE 7 YRS/> IM: CPT

## 2023-05-05 PROCEDURE — 82962 GLUCOSE BLOOD TEST: CPT

## 2023-05-05 PROCEDURE — 99285 EMERGENCY DEPT VISIT HI MDM: CPT | Mod: 25

## 2023-05-05 PROCEDURE — 90471 IMMUNIZATION ADMIN: CPT

## 2023-05-05 PROCEDURE — 99284 EMERGENCY DEPT VISIT MOD MDM: CPT

## 2023-05-05 RX ORDER — TETANUS TOXOID, REDUCED DIPHTHERIA TOXOID AND ACELLULAR PERTUSSIS VACCINE, ADSORBED 5; 2.5; 8; 8; 2.5 [IU]/.5ML; [IU]/.5ML; UG/.5ML; UG/.5ML; UG/.5ML
0.5 SUSPENSION INTRAMUSCULAR ONCE
Refills: 0 | Status: COMPLETED | OUTPATIENT
Start: 2023-05-05 | End: 2023-05-05

## 2023-05-05 RX ADMIN — TETANUS TOXOID, REDUCED DIPHTHERIA TOXOID AND ACELLULAR PERTUSSIS VACCINE, ADSORBED 0.5 MILLILITER(S): 5; 2.5; 8; 8; 2.5 SUSPENSION INTRAMUSCULAR at 15:36

## 2023-05-05 NOTE — ED PROVIDER NOTE - NSFOLLOWUPINSTRUCTIONS_ED_ALL_ED_FT
Please follow up with your primary care doctor.   Please return to the emergency department for any change in behaviour, inability to walk or any other concerns.

## 2023-05-05 NOTE — ED ADULT NURSE NOTE - CHIEF COMPLAINT QUOTE
Pt states "I tripped fell on my face and now my nose hurts. My lip was bleeding." No active bleeding noted. Dried blood to lower lip noted. Pt on ASA daily. No other injuries per pt. Pt denies LOC

## 2023-05-05 NOTE — ED PROVIDER NOTE - OBJECTIVE STATEMENT
88 y/o F hx of DM2, HTN presenting with mechanical fall.    Patient states she was walking out of restaurant and tripped and braced herself on advertisement sign.  No LOC or headache. She denies any syncope or chest pain. Patient was ambulatory after fall.   No joint pains. She notes some bruising to tip of nose and chin. No broken teeth. 88 y/o F hx of DM2, HTN presenting with mechanical fall.    Patient states she was walking out of restaurant and tripped and braced herself on advertisement sign.  No LOC or headache. She denies any syncope or chest pain. Patient was ambulatory after fall.   No joint pains. She notes some bruising to tip of nose and chin. No broken teeth or neck pain. No use of anticoagulants or antiplatelet agents. 86 y/o F hx of DM2, HTN presenting with mechanical fall.    Patient states she was walking out of restaurant and tripped and braced herself on advertisement sign.  No LOC or headache. She denies any syncope or chest pain. Patient was ambulatory after fall.   No joint pains. She notes some bruising to tip of nose and chin. No broken teeth or neck pain. No use of anticoagulants or antiplatelet agents. No headache, nausea/vomiting, dizziness or gait instability.

## 2023-05-05 NOTE — ED ADULT NURSE NOTE - HOW OFTEN DO YOU HAVE A DRINK CONTAINING ALCOHOL?
[facility]  Discharge Note  Short Stay    Procedure(s) (LRB):  MYRINGOTOMY, WITH TYMPANOSTOMY TUBE INSERTION (Bilateral)    OUTCOME: Patient tolerated treatment/procedure well without complication and is now ready for discharge.    DISPOSITION: Home or Self Care    FINAL DIAGNOSIS:  Recurrent OM    FOLLOWUP: In clinic  
Never

## 2023-05-05 NOTE — ED PROVIDER NOTE - PROGRESS NOTE DETAILS
Patient well-appearing; ambulating in ER without any difficulty no neurological changes. Return precautions discussed.

## 2023-05-05 NOTE — ED PROVIDER NOTE - CLINICAL SUMMARY MEDICAL DECISION MAKING FREE TEXT BOX
86 y/o F presenting with mechanical fall  Noted abrasion to chin and nasal tip  No signs of head trauma or max/fac trauma  Defer imaging. Patient well-appearing at baseline mental status  Wounds cleaned and BG checked. Tdap booster given.

## 2023-05-05 NOTE — ED ADULT NURSE NOTE - OBJECTIVE STATEMENT
Pt arrived to ED c/o fall. Pt stated "I tripped and caught myself on an advertisement but it fell and I fell on my face. Pt reports bleeding in the mouth and chin. Pt denies pain to the face/nose currently, nose presented with redness but no obvious deformities. PMHx of HTN and DM. Pt reports taking ASA 81mg daily.

## 2023-05-05 NOTE — ED PROVIDER NOTE - PHYSICAL EXAMINATION
GENERAL: no acute distress; well-developed  HEAD:  Atraumatic, see skin exam  EYES: EOMI, PERRLA,  ENT: MMM; oropharynx clear  NECK: Supple, No JVD  CHEST/LUNG: Clear to auscultation bilaterally; No wheeze  HEART: Regular rate and rhythm; No murmurs, rubs, or gallops  ABDOMEN: Soft, Nontender, Nondistended; Bowel sounds present  EXTREMITIES:  2+ Peripheral Pulses, No clubbing, cyanosis, or edema  PSYCH: AAOx3  NEUROLOGY: no focal motor or sensory deficits. 5/5 muscle strength in all extremities.   SKIN: abrasion to nasal tip and chin

## 2023-05-05 NOTE — ED ADULT NURSE NOTE - NS ED NURSE LEVEL OF CONSCIOUSNESS MENTAL STATUS
[FreeTextEntry1] : 84 y.o. 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

## 2023-05-05 NOTE — ED ADULT TRIAGE NOTE - CHIEF COMPLAINT QUOTE
Pt states "I fell on my face and now my nose hurts. My lip was bleeding." No active bleeding noted. Dried blood to lower lip noted. Pt on ASA daily. No other injuries per pt. Pt states "I tripped fell on my face and now my nose hurts. My lip was bleeding." No active bleeding noted. Dried blood to lower lip noted. Pt on ASA daily. No other injuries per pt. Pt denies LOC

## 2023-05-05 NOTE — ED PROVIDER NOTE - PATIENT PORTAL LINK FT
You can access the FollowMyHealth Patient Portal offered by [facility] by registering at the following website: [email or web address]. By joining [employer]’s FollowMyHealth portal, you will also be able to view your health information using other applications (apps) compatible with our system.

## 2023-05-09 ENCOUNTER — APPOINTMENT (OUTPATIENT)
Dept: NEPHROLOGY | Facility: CLINIC | Age: 88
End: 2023-05-09
Payer: MEDICARE

## 2023-05-09 VITALS — SYSTOLIC BLOOD PRESSURE: 138 MMHG | HEART RATE: 74 BPM | DIASTOLIC BLOOD PRESSURE: 82 MMHG | WEIGHT: 152 LBS

## 2023-05-09 PROBLEM — I10 ESSENTIAL (PRIMARY) HYPERTENSION: Chronic | Status: ACTIVE | Noted: 2023-05-06

## 2023-05-09 PROBLEM — E11.9 TYPE 2 DIABETES MELLITUS WITHOUT COMPLICATIONS: Chronic | Status: ACTIVE | Noted: 2023-05-06

## 2023-05-09 PROCEDURE — 99214 OFFICE O/P EST MOD 30 MIN: CPT

## 2023-05-09 NOTE — PHYSICAL EXAM
[General Appearance - Alert] : alert [General Appearance - In No Acute Distress] : in no acute distress [] : no respiratory distress [Respiration, Rhythm And Depth] : normal respiratory rhythm and effort [Auscultation Breath Sounds / Voice Sounds] : lungs were clear to auscultation bilaterally [Heart Rate And Rhythm] : heart rate was normal and rhythm regular [Heart Sounds] : normal S1 and S2 [Heart Sounds Gallop] : no gallops [Murmurs] : no murmurs [Heart Sounds Pericardial Friction Rub] : no pericardial rub [Full Pulse] : the pedal pulses are present [Edema] : there was no peripheral edema [No CVA Tenderness] : no ~M costovertebral angle tenderness [Oriented To Time, Place, And Person] : oriented to person, place, and time [Impaired Insight] : insight and judgment were intact

## 2023-05-10 NOTE — HISTORY OF PRESENT ILLNESS
[FreeTextEntry1] : 88 yo woman with CKD 4,  HTN, DMT2, MGUS and non nephrotic range proteinuria, here for follow up evaluation\par feel on sidewalk last week- no fractures- facial injuries; /90 in ED\par weight up 2 pounds- \par reports that home BPs running 130-136/80 range\par 140 highest\par returning to [place] in June\par energy and appetite good\par daily walks; No uremic symptoms\par Denies flank pain, dysuria, hematuria or frothy urine \par No CP, SOB, \par \par \par \par \par \par \par

## 2023-05-10 NOTE — ASSESSMENT
[FreeTextEntry1] : all lab data was reviewed with patient in detail from 5/5/2023\par 86 yo woman with HTN, DM2, MGUS and CKD 4 with non nephrotic range proteinuria. \par --HTN -  BP still above target, but is improving. she feels that it will improve further once she loses some weight in [place]- okay to defer adjust of meds today\par will monitor and determine best path forward as we try to optimize BP and delay progression of CKD\par --CKD 4-  creatinine 2.82- within known range-\par discussed low eGFR- not uremic.\par Aware that she may need dialysis in future-  has verbalized that she would prefer in center [facility].\par avoiding NSAIDS, limiting protein intake; \par -proteinuria- continue to hold losartan for low eGFR\par -hyperkalemia-  diet controlled- K 4.2- good\par  -- DMT2-- A1C good\par -hyperlipidemia-  LP controlled;   c/w crestor and keeping weight down\par - MGUS- faint band IgG Kappa- stable--\par -secondary hyperparathyroidism- PTH 78- improved-  c/w Vit D\par -anemia- hgb 11.7- improved- good- no ALEXIS indicated\par -hyperphosphatemia-  mild, P 4.6-  limiting P rich foods; defer binders\par \par \par OV here 4-6 months, when returns from [place] ([place])\par \par \par \par \par \par

## 2023-07-31 ENCOUNTER — RX RENEWAL (OUTPATIENT)
Age: 88
End: 2023-07-31

## 2023-07-31 RX ORDER — ROSUVASTATIN CALCIUM 20 MG/1
20 TABLET, FILM COATED ORAL
Qty: 90 | Refills: 3 | Status: ACTIVE | COMMUNITY
Start: 2021-11-23 | End: 1900-01-01

## 2023-08-01 ENCOUNTER — RX RENEWAL (OUTPATIENT)
Age: 88
End: 2023-08-01

## 2023-08-01 RX ORDER — CARVEDILOL 12.5 MG/1
12.5 TABLET, FILM COATED ORAL
Qty: 180 | Refills: 3 | Status: ACTIVE | COMMUNITY
Start: 2020-10-01 | End: 1900-01-01

## 2023-12-11 ENCOUNTER — APPOINTMENT (OUTPATIENT)
Dept: NEPHROLOGY | Facility: CLINIC | Age: 88
End: 2023-12-11
Payer: MEDICARE

## 2023-12-11 VITALS — HEART RATE: 76 BPM | DIASTOLIC BLOOD PRESSURE: 84 MMHG | SYSTOLIC BLOOD PRESSURE: 148 MMHG

## 2023-12-11 DIAGNOSIS — Z86.39 PERSONAL HISTORY OF OTHER ENDOCRINE, NUTRITIONAL AND METABOLIC DISEASE: ICD-10-CM

## 2023-12-11 DIAGNOSIS — E83.39 OTHER DISORDERS OF PHOSPHORUS METABOLISM: ICD-10-CM

## 2023-12-11 PROCEDURE — 99215 OFFICE O/P EST HI 40 MIN: CPT

## 2023-12-13 PROBLEM — Z86.39 HISTORY OF HYPERLIPIDEMIA: Status: ACTIVE | Noted: 2018-05-21

## 2023-12-13 PROBLEM — E83.39 HYPERPHOSPHATEMIA: Status: ACTIVE | Noted: 2023-12-13

## 2023-12-13 NOTE — ASSESSMENT
[FreeTextEntry1] : all lab data was reviewed with patient in detail from 12/8/2023 87 yo woman with HTN, DM2, MGUS and CKD 4 with non nephrotic range proteinuria. -HTN- BP above goal- advised adjust meds- prefers to monitor consider restart RASi has data from [facility]- to review -CKD 4 creat within known range consider repeat 24H urine creat and prot. avoiding NSAIDS, limiting protein intake; still concerned about possibility of needing RRT- discussed with her has verbalized that she would prefer in center [facility]. -proteinuria- consider addition of RASi- await data from [facility] as above, consider repeat 24H urine -hyperkalemia- diet controlled- K 4.4 good -hyperphosphatemia- P4.7- if rises add binder  -- DMT2-- A1C good -hyperlipidemia- LP controlled; c/w crestor and keeping weight down - MGUS- faint band IgG Kappa- stable-- -secondary hyperparathyroidism- stable- c/w Vit D -anemia- hgb 11.4- stable-  no ALEXIS indicated   OV here 4-6 months

## 2023-12-13 NOTE — HISTORY OF PRESENT ILLNESS
[FreeTextEntry1] : 87 yo woman here for f/u evaluation of CKD 4,  HTN, DMT2, MGUS and non nephrotic range proteinuria. feeling well- just returned from [facility] 2 weeks ago. energy, appetite good no new medical events reports that MD in [facility] collected 24H urine and that her eGFR was better than that calculated here in [facility]- will forward records no signs of uremia Denies flank pain, dysuria, hematuria or frothy urine  No CP, SOB,

## 2024-05-29 ENCOUNTER — APPOINTMENT (OUTPATIENT)
Dept: NEPHROLOGY | Facility: CLINIC | Age: 89
End: 2024-05-29
Payer: MEDICARE

## 2024-05-29 VITALS — HEART RATE: 77 BPM | SYSTOLIC BLOOD PRESSURE: 152 MMHG | DIASTOLIC BLOOD PRESSURE: 84 MMHG

## 2024-05-29 DIAGNOSIS — E11.9 TYPE 2 DIABETES MELLITUS W/OUT COMPLICATIONS: ICD-10-CM

## 2024-05-29 DIAGNOSIS — N18.5 CHRONIC KIDNEY DISEASE, STAGE 5: ICD-10-CM

## 2024-05-29 DIAGNOSIS — R80.9 PROTEINURIA, UNSPECIFIED: ICD-10-CM

## 2024-05-29 DIAGNOSIS — E87.5 HYPERKALEMIA: ICD-10-CM

## 2024-05-29 DIAGNOSIS — N25.81 SECONDARY HYPERPARATHYROIDISM OF RENAL ORIGIN: ICD-10-CM

## 2024-05-29 DIAGNOSIS — R31.29 OTHER MICROSCOPIC HEMATURIA: ICD-10-CM

## 2024-05-29 DIAGNOSIS — D47.2 MONOCLONAL GAMMOPATHY: ICD-10-CM

## 2024-05-29 DIAGNOSIS — I10 ESSENTIAL (PRIMARY) HYPERTENSION: ICD-10-CM

## 2024-05-29 DIAGNOSIS — N18.4 CHRONIC KIDNEY DISEASE, STAGE 4 (SEVERE): ICD-10-CM

## 2024-05-29 PROCEDURE — G2211 COMPLEX E/M VISIT ADD ON: CPT

## 2024-05-29 PROCEDURE — 99214 OFFICE O/P EST MOD 30 MIN: CPT

## 2024-06-01 PROBLEM — N25.81 SECONDARY HYPERPARATHYROIDISM: Status: ACTIVE | Noted: 2019-05-23

## 2024-06-01 PROBLEM — N18.4 STAGE 4 CHRONIC KIDNEY DISEASE: Noted: 2018-05-21

## 2024-06-01 PROBLEM — D47.2 MGUS (MONOCLONAL GAMMOPATHY OF UNKNOWN SIGNIFICANCE): Status: ACTIVE | Noted: 2017-05-05

## 2024-06-01 PROBLEM — N18.5 CKD (CHRONIC KIDNEY DISEASE), STAGE V: Status: ACTIVE | Noted: 2024-06-01

## 2024-06-01 PROBLEM — E87.5 HYPERKALEMIA: Status: ACTIVE | Noted: 2018-05-21

## 2024-06-01 NOTE — ASSESSMENT
[FreeTextEntry1] : all lab data was reviewed with patient in detail from 5/25/2024 87 yo woman now transitioned to CKD 5; h/o HTN,  DM2, MGUS and CKD 4 with non nephrotic range proteinuria. -HTN- BP not at target here in office - suggested to adjust meds- she prefers to see how BP does when returns to [facility] -CKD 5 creat mild up tick 3.26 cystatin c GFR 11 or consider repeat 24H urine creat and prot. when in [place] avoiding NSAIDS, limiting protein intake; rediscussed possibility of needing RRT she is aware and can arrange in [place] if something were to occur has verbalized that she would prefer in [facility]. -proteinuria-  UAC 1793; Uprot/creat 3.2- as above add low dose RASi or [id number]- will repeat data in [place] -hyperkalemia- diet controlled- K 4.9- okay -hyperphosphatemia- P 4.5- okay- if rises add binder  -- DMT2-- A1C good -hyperlipidemia- LP acceptable c/w crestor  - MGUS- faint band IgG Kappa- stable-- -secondary hyperparathyroidism- sPTH 184table- c/w Vit D -anemia- hgb 10.9- TSat 38%-   no ALEXIS needed yet.   OV 3-4 months

## 2024-06-01 NOTE — HISTORY OF PRESENT ILLNESS
[FreeTextEntry1] : 87 yo woman with CKD 4,  HTN, DMT2, MGUS and non nephrotic range proteinuria, for follow up evaluation no new medical events since last visit traveling back to [facility] in 2 weeks- after her [birth year]! a bit less energy, but desire to go out and about still strong; appetite very good no signs of uremia Denies flank pain, dysuria, hematuria or frothy urine  No CP, SOB,

## 2024-11-11 ENCOUNTER — EMERGENCY (EMERGENCY)
Facility: HOSPITAL | Age: 89
LOS: 1 days | Discharge: HOME | End: 2024-11-11
Attending: EMERGENCY MEDICINE | Admitting: STUDENT IN AN ORGANIZED HEALTH CARE EDUCATION/TRAINING PROGRAM
Payer: MEDICARE

## 2024-11-11 ENCOUNTER — NON-APPOINTMENT (OUTPATIENT)
Age: 89
End: 2024-11-11

## 2024-11-11 VITALS
DIASTOLIC BLOOD PRESSURE: 104 MMHG | HEART RATE: 87 BPM | SYSTOLIC BLOOD PRESSURE: 223 MMHG | RESPIRATION RATE: 18 BRPM | OXYGEN SATURATION: 98 % | TEMPERATURE: 98 F

## 2024-11-11 VITALS
DIASTOLIC BLOOD PRESSURE: 120 MMHG | OXYGEN SATURATION: 97 % | SYSTOLIC BLOOD PRESSURE: 215 MMHG | RESPIRATION RATE: 16 BRPM | HEART RATE: 75 BPM

## 2024-11-11 PROCEDURE — 70450 CT HEAD/BRAIN W/O DYE: CPT | Mod: MC

## 2024-11-11 PROCEDURE — 70450 CT HEAD/BRAIN W/O DYE: CPT | Mod: 26,MC

## 2024-11-11 PROCEDURE — 72125 CT NECK SPINE W/O DYE: CPT | Mod: MC

## 2024-11-11 PROCEDURE — 90715 TDAP VACCINE 7 YRS/> IM: CPT

## 2024-11-11 PROCEDURE — 90471 IMMUNIZATION ADMIN: CPT

## 2024-11-11 PROCEDURE — 70486 CT MAXILLOFACIAL W/O DYE: CPT | Mod: 26,MC

## 2024-11-11 PROCEDURE — 72125 CT NECK SPINE W/O DYE: CPT | Mod: 26,MC

## 2024-11-11 PROCEDURE — 99285 EMERGENCY DEPT VISIT HI MDM: CPT

## 2024-11-11 PROCEDURE — 70486 CT MAXILLOFACIAL W/O DYE: CPT | Mod: MC

## 2024-11-11 PROCEDURE — 13151 CMPLX RPR E/N/E/L 1.1-2.5 CM: CPT | Mod: E1

## 2024-11-11 PROCEDURE — 99285 EMERGENCY DEPT VISIT HI MDM: CPT | Mod: 25

## 2024-11-11 RX ORDER — ACETAMINOPHEN 500 MG
650 TABLET ORAL ONCE
Refills: 0 | Status: COMPLETED | OUTPATIENT
Start: 2024-11-11 | End: 2024-11-11

## 2024-11-11 RX ORDER — AMLODIPINE BESYLATE 10 MG
1 TABLET ORAL
Qty: 30 | Refills: 0
Start: 2024-11-11 | End: 2024-12-10

## 2024-11-11 RX ORDER — CLOSTRIDIUM TETANI TOXOID ANTIGEN (FORMALDEHYDE INACTIVATED), CORYNEBACTERIUM DIPHTHERIAE TOXOID ANTIGEN (FORMALDEHYDE INACTIVATED), BORDETELLA PERTUSSIS TOXOID ANTIGEN (GLUTARALDEHYDE INACTIVATED), BORDETELLA PERTUSSIS FILAMENTOUS HEMAGGLUTININ ANTIGEN (FORMALDEHYDE INACTIVATED), BORDETELLA PERTUSSIS PERTACTIN ANTIGEN, AND BORDETELLA PERTUSSIS FIMBRIAE 2/3 ANTIGEN 5; 2; 2.5; 5; 3; 5 [LF]/.5ML; [LF]/.5ML; UG/.5ML; UG/.5ML; UG/.5ML; UG/.5ML
0.5 INJECTION, SUSPENSION INTRAMUSCULAR ONCE
Refills: 0 | Status: COMPLETED | OUTPATIENT
Start: 2024-11-11 | End: 2024-11-11

## 2024-11-11 RX ORDER — AMLODIPINE BESYLATE 10 MG
5 TABLET ORAL ONCE
Refills: 0 | Status: COMPLETED | OUTPATIENT
Start: 2024-11-11 | End: 2024-11-11

## 2024-11-11 RX ADMIN — CLOSTRIDIUM TETANI TOXOID ANTIGEN (FORMALDEHYDE INACTIVATED), CORYNEBACTERIUM DIPHTHERIAE TOXOID ANTIGEN (FORMALDEHYDE INACTIVATED), BORDETELLA PERTUSSIS TOXOID ANTIGEN (GLUTARALDEHYDE INACTIVATED), BORDETELLA PERTUSSIS FILAMENTOUS HEMAGGLUTININ ANTIGEN (FORMALDEHYDE INACTIVATED), BORDETELLA PERTUSSIS PERTACTIN ANTIGEN, AND BORDETELLA PERTUSSIS FIMBRIAE 2/3 ANTIGEN 0.5 MILLILITER(S): 5; 2; 2.5; 5; 3; 5 INJECTION, SUSPENSION INTRAMUSCULAR at 14:11

## 2024-11-11 RX ADMIN — Medication 650 MILLIGRAM(S): at 14:12

## 2024-11-11 RX ADMIN — Medication 5 MILLIGRAM(S): at 20:30

## 2024-11-11 RX ADMIN — Medication 10 MILLILITER(S): at 19:30

## 2024-11-11 NOTE — ED PROVIDER NOTE - CLINICAL SUMMARY MEDICAL DECISION MAKING FREE TEXT BOX
88 yo F s/p mechanical fall with injuries to the head and face.  Will need CT head, facial bones, cervical spine.  Laceration will be better visualized when CTs complete. If needed, will have plastics or ophtho repair.  Will continue to monitor her pressure; may be stress/pain induced.  Tetanus will be updated. 88 yo F s/p mechanical fall with injuries to the head and face.  Will need CT head, facial bones, cervical spine.  Laceration will be better visualized when CTs complete. If needed, will have plastics or ophtho repair.  Will continue to monitor her pressure; may be stress/pain induced.  Tetanus will be updated.    Case was d/w Ophtho who was going to perform repair but fellow advised that his attending asked him to defer to plastics.  Case was d/w Dr. Gates who came to the ER to perform the repair.  Patient remained well throughout the visit.  Patient and family were kept updated.

## 2024-11-11 NOTE — ED PROVIDER NOTE - PHYSICAL EXAMINATION
General:  Well appearing, no distress  HEENT:  PERRL/EOMI without pain or limitation.  There is a 1.5cm laceration to the lower left eyelid.  Will need better visualization in a lit area and pt will be moved after cleared from CTS. There is a bruise and hematoma to the left upper forehead/frontal scalp, a bruise to the lower right cheek and another to the chin.  No dental injury   Chest:  Non-tender, no crepitance  Lungs:  Clear to auscultation bilaterally   Heart:  s1s2 normal, no murmur  Abdomen:  soft, non-tender, non-distended  :  Deferred  Rectal:  Deferred  Extremities: No edema, normal perfusion, no joint swelling or tenderness  Neuro:  Alert, conversant, motor/sensory  intact

## 2024-11-11 NOTE — CONSULT NOTE ADULT - ASSESSMENT
IMP- complex laceration LEFT upper eyelid    PLAN-  complex repair LEFT upper eyelid laceration, 1.5 cm

## 2024-11-11 NOTE — ED PROVIDER NOTE - PATIENT PORTAL LINK FT
You can access the FollowMyHealth Patient Portal offered by [facility] by registering at the following website: [email or web address]. By joining [employer]’s FollowMyHealth portal, you will also be able to view your health information using other applications (apps) compatible with our system.

## 2024-11-11 NOTE — CONSULT NOTE ADULT - PROVIDER SPECIALTY LIST ADULT
Postoperative knee replacement follow-up clinic visit Dr Hayes  DIAGNOSIS: end stage Osteoarthritis of Right knee.   PROCEDURE PERFORMED: 4/9/17, Right total knee arthroplasty. ([facility])    Zaira Fierro is doing well after last surgery listed above.  She has several complaints about her pain medication service at the nursing home she is staying at, but has been satisfied with her physical therapy   Preoperative knee pain is  Partially Present  Analgesic medication: Dilaudid (Oral)    EXAM:  Incision healing, clean and dry.  Joint range of motion  With minimal pain. Extends to 170. Flexes to 90   Effusion: none  Periarticular swelling or leg edema:   3-4mm pitting edema L leg  2-3mm pitting edema R leg   Peroneal and tibial nerve function: intact     Intraoperative cultures:  none     IMAGING:  Radiographs demonstrate the knee implant in satisfactory position without evidence of any complication.    IMPRESSION:  Excellent outcome to date after knee replacement.     PLAN:    Continue range of motion exercises and stretching.    Quadriceps strengthening exercises.    May be weight bearing as tolerated.    Venous duplex today to assess for DVT    Continue warfarin for 4 weeks total     Refill of:  (meds listed above): not needed.    Patient given instructions re: prophylactic antibiotic recommendations during dental work.    Next follow-up visit at 1 year after surgery or sooner as needed.       Aamir Sutton M.D.  [employer] Professor  Oncology and Adult Reconstructive Surgery  Dept Orthopaedic Surgery, [facility]  [phone] office  [email or web address]    
Plastic Surgery

## 2024-11-11 NOTE — ED ADULT TRIAGE NOTE - CHIEF COMPLAINT QUOTE
+ mechanical slip and fall on elevator ("my hallways are carpeted but the lift isn't") with rollator, with impact to R knee and face with hematoma and lac to L eyelid   PMH htn off meds x 2 years, on [id number]

## 2024-11-11 NOTE — ED PROVIDER NOTE - NSFOLLOWUPINSTRUCTIONS_ED_ALL_ED_FT
YOU HAD A CAT SCAN OF YOUR NECK AND OF YOUR SKULL AND BRAIN TODAY.    NEITHER SCAN SHOWED ANY BROKEN BONES AND THERE IS NO BLEEDING IN THE BRAIN.    HOWEVER, CAT SCANS DO NOT SHOW IF A PERSON HAS A CONCUSSION.  A CONCUSSION IS A GROUP OF SYMPTOMS THAT CAN DEVELOPED AFTER A PERSON HAS A HEAD INJURY.  THESE SYMPTOMS INCLUDE: HEADACHE, NAUSEA, DIZZINESS, A FOGGY FEELING.  THEY CAN LAST ANYWHERE FROM A DAY TO A WEEK OR LONGER.      IT IS IMPORTANT TO HAVE A FOLLOW UP VISIT WITH YOUR DOCTOR THIS WEEK TO CHECK ON YOUR SYMPTOMS.  IF YOU DO DEVELOP A CONCUSSION, THEY WILL NEED TO MONITOR YOU FOR IMPROVEMENT AND TO DETERMINE IF YOU NEED TO SEE A NEUROLOGIST FOR SPECIALTY CARE.     FOR NOW, REST AS NEEDED AND KEEP WELL-HYDRATED.  TAKE TYLENOL FOR PAIN.  YOU DO NOT NEED TO STAY IN BED.  YOU CAN DO YOUR NORMAL ACTIVITIES.  BE MINDFUL OF THINGS THAT MAKE YOU HAVE ANY OF THE ABOVE SYMPTOMS.  MANY PEOPLE FIND THAT USING THEIR PHONE OR WATCHING TV CAN TRIGGER SYMPTOMS.  IF THIS HAPPENS, DO THESE ACTIVITIES FOR SHORTER PERIODS OF TIME.     BLEEDING IN THE BRAIN CAN SOMETIMES SHOW UP AT A LATER TIME, EVEN A MONTH OR LONGER AFTER THE ORIGINAL INJURY.  IT IS UNUSUAL, BUT IMPORTANT TO KNOW ABOUT. IF YOU NOTICE THAT YOU ARE HAVING HEADACHES THAT ARE NEW OR UNUSUAL FOR YOU OR THAT YOU ARE NAUSEAS, OFF-BALANCE, HAVE BLURRED VISION, TROUBLE WITH YOUR MEMORY OR ANY OTHER NEW OR CONCERNING SYMPTOMS, YOU NEED TO BE SEEN IN THE EMERGENCY ROOM TO HAVE ANOTHER CAT SCAN.     RETURN TO THE EMERGENCY ROOM IMMEDIATELY FOR:  VOMITING  SEVERE HEADACHE  VOMITING  NUMBNESS OR WEAKNESS TO THE FACE, ARMS OR LEGS  TROUBLE WALKING, TALKING OR SEEING  ANY NEW, WORSENING OR CONCERNING SYMPTOMS.     FOR THE WOUND:  FOLLOW INSTRUCTIONS FROM PLASTIC SURGEON  HIS NAME APPEARS BELOW  RETURN IMMEDIATELY FOR:  PUS DRAINING FROM THE WOUND  THE WOUND OPENS   YOU HAVE TROUBLE WITH YOUR VISION  PAIN WHEN YOU MOVE YOUR EYE  REDNESS OR SWELLING TO THE SKIN   FEVER  ANY NEW, WORSENING OR CONCERNING SYMPTOMS

## 2024-11-11 NOTE — CONSULT NOTE ADULT - SUBJECTIVE AND OBJECTIVE BOX
89 year old who fell sustaining multiple injuries including RIGHT upper eyelid laceration  Pt c/o pain, bleeding, deformity    ED, Radiology and nursing notes reviewed  case d/w ED and nursing staff    ROS- n/c  PMH- reviewed and noted  PSH- reviewed and noted  MEDS- reviewed and noted  ALL- reviewed and noted    PE- system specific  LEFT upper eyelid  1.5 cm transverse laceration  located mid eyelid proper  deep through orbicularis  moderate edema  mild ecchymosis  moderate tenderness    CT- reading reviewed and noted  no fracture seen

## 2024-11-11 NOTE — ED PROVIDER NOTE - OBJECTIVE STATEMENT
89 yo F PMH HTN last treated 2 years ago.  Medications were stopped due to change in kidney function.  Last MD visit 5 months ago-no issue with her pressure.  Due for f/u visit in December.  Patient presents for evaluation after mechanical fall.  She was walking into the elevator with her walker.  The carpet was not flush with the elevator and the walker caught.  She fell forward and hit her face on the walker.  No loc.  She has a bruise to the left forehead, a cut to her left eyelid and bruises to the cheek and chin.  No change in vision or pain with eye movement.  No cp, sob, abd pain, back pain, neck pain or extremity pain or injury.

## 2024-11-11 NOTE — ED ADULT NURSE NOTE - OBJECTIVE STATEMENT
89yF pmhx HTN (not on medication b/c "it was hurting my kidneys") presents to the ER complaining of a mechanical trip and fall (-) LOC (+) head strike on qd aspirin. Pt states she was walking when he walker got caught and she fell forward striking her head. Was able to stand up right away. Denies headache, changes in vision, numbness/tingling, weakness. pain, SOB/CP. BEFAST (-)

## 2024-11-11 NOTE — ED ADULT NURSE NOTE - NSFALLHARMRISKINTERV_ED_ALL_ED

## 2024-11-13 ENCOUNTER — APPOINTMENT (OUTPATIENT)
Dept: NEPHROLOGY | Facility: CLINIC | Age: 89
End: 2024-11-13
Payer: MEDICARE

## 2024-11-13 VITALS — DIASTOLIC BLOOD PRESSURE: 95 MMHG | HEART RATE: 84 BPM | SYSTOLIC BLOOD PRESSURE: 156 MMHG

## 2024-11-13 DIAGNOSIS — N18.5 CHRONIC KIDNEY DISEASE, STAGE 5: ICD-10-CM

## 2024-11-13 DIAGNOSIS — E11.9 TYPE 2 DIABETES MELLITUS W/OUT COMPLICATIONS: ICD-10-CM

## 2024-11-13 DIAGNOSIS — E87.5 HYPERKALEMIA: ICD-10-CM

## 2024-11-13 DIAGNOSIS — I10 ESSENTIAL (PRIMARY) HYPERTENSION: ICD-10-CM

## 2024-11-13 DIAGNOSIS — E83.39 OTHER DISORDERS OF PHOSPHORUS METABOLISM: ICD-10-CM

## 2024-11-13 DIAGNOSIS — N25.81 SECONDARY HYPERPARATHYROIDISM OF RENAL ORIGIN: ICD-10-CM

## 2024-11-13 PROCEDURE — G2211 COMPLEX E/M VISIT ADD ON: CPT

## 2024-11-13 PROCEDURE — 99215 OFFICE O/P EST HI 40 MIN: CPT

## 2024-11-13 RX ORDER — AMLODIPINE BESYLATE 5 MG/1
5 TABLET ORAL
Refills: 0 | Status: ACTIVE | COMMUNITY

## 2024-11-14 ENCOUNTER — EMERGENCY (EMERGENCY)
Facility: HOSPITAL | Age: 89
LOS: 1 days | Discharge: HOME | End: 2024-11-14
Attending: STUDENT IN AN ORGANIZED HEALTH CARE EDUCATION/TRAINING PROGRAM | Admitting: EMERGENCY MEDICINE
Payer: MEDICARE

## 2024-11-14 VITALS
HEIGHT: 62 IN | TEMPERATURE: 98 F | SYSTOLIC BLOOD PRESSURE: 160 MMHG | HEART RATE: 76 BPM | OXYGEN SATURATION: 95 % | RESPIRATION RATE: 16 BRPM | DIASTOLIC BLOOD PRESSURE: 81 MMHG | WEIGHT: 119.93 LBS

## 2024-11-14 LAB
ANION GAP SERPL CALC-SCNC: 12 MMOL/L — SIGNIFICANT CHANGE UP (ref 5–17)
BUN SERPL-MCNC: 37 MG/DL — HIGH (ref 7–23)
CALCIUM SERPL-MCNC: 9.2 MG/DL — SIGNIFICANT CHANGE UP (ref 8.4–10.5)
CHLORIDE SERPL-SCNC: 109 MMOL/L — HIGH (ref 96–108)
CO2 SERPL-SCNC: 21 MMOL/L — LOW (ref 22–31)
CREAT SERPL-MCNC: 3.52 MG/DL — HIGH (ref 0.5–1.3)
EGFR: 12 ML/MIN/1.73M2 — LOW
GLUCOSE SERPL-MCNC: 209 MG/DL — HIGH (ref 70–99)
HCT VFR BLD CALC: 34.2 % — LOW (ref 34.5–45)
HGB BLD-MCNC: 11.2 G/DL — LOW (ref 11.5–15.5)
MCHC RBC-ENTMCNC: 31.3 PG — SIGNIFICANT CHANGE UP (ref 27–34)
MCHC RBC-ENTMCNC: 32.7 G/DL — SIGNIFICANT CHANGE UP (ref 32–36)
MCV RBC AUTO: 95.5 FL — SIGNIFICANT CHANGE UP (ref 80–100)
NRBC # BLD: 0 /100 WBCS — SIGNIFICANT CHANGE UP (ref 0–0)
PLATELET # BLD AUTO: 259 K/UL — SIGNIFICANT CHANGE UP (ref 150–400)
POTASSIUM SERPL-MCNC: 3.6 MMOL/L — SIGNIFICANT CHANGE UP (ref 3.5–5.3)
POTASSIUM SERPL-SCNC: 3.6 MMOL/L — SIGNIFICANT CHANGE UP (ref 3.5–5.3)
RBC # BLD: 3.58 M/UL — LOW (ref 3.8–5.2)
RBC # FLD: 13.2 % — SIGNIFICANT CHANGE UP (ref 10.3–14.5)
SODIUM SERPL-SCNC: 142 MMOL/L — SIGNIFICANT CHANGE UP (ref 135–145)
TROPONIN T, HIGH SENSITIVITY RESULT: 33 NG/L — SIGNIFICANT CHANGE UP (ref 0–51)
TROPONIN T, HIGH SENSITIVITY RESULT: 35 NG/L — SIGNIFICANT CHANGE UP (ref 0–51)
WBC # BLD: 8.29 K/UL — SIGNIFICANT CHANGE UP (ref 3.8–10.5)
WBC # FLD AUTO: 8.29 K/UL — SIGNIFICANT CHANGE UP (ref 3.8–10.5)

## 2024-11-14 PROCEDURE — 80048 BASIC METABOLIC PNL TOTAL CA: CPT

## 2024-11-14 PROCEDURE — 36415 COLL VENOUS BLD VENIPUNCTURE: CPT

## 2024-11-14 PROCEDURE — 84484 ASSAY OF TROPONIN QUANT: CPT

## 2024-11-14 PROCEDURE — 99284 EMERGENCY DEPT VISIT MOD MDM: CPT

## 2024-11-14 PROCEDURE — 70450 CT HEAD/BRAIN W/O DYE: CPT | Mod: MC

## 2024-11-14 PROCEDURE — 85027 COMPLETE CBC AUTOMATED: CPT

## 2024-11-14 PROCEDURE — 70450 CT HEAD/BRAIN W/O DYE: CPT | Mod: 26,MC

## 2024-11-14 PROCEDURE — 99284 EMERGENCY DEPT VISIT MOD MDM: CPT | Mod: 25

## 2024-11-14 NOTE — ED PROVIDER NOTE - NSFOLLOWUPINSTRUCTIONS_ED_ALL_ED_FT
Please follow up with nephrologist regarding progressive CKD. Return to ER for recurrent falls, worsening confusion, chest pain, shortness of breath, head strike. Please follow up with nephrologist regarding progressive CKD. Return to ER for recurrent falls, worsening confusion, chest pain, shortness of breath, head strike.    Near Syncope    WHAT YOU NEED TO KNOW:    Near syncope, also called presyncope, is the feeling that you may faint (lose consciousness), but you do not. You can control some health conditions that cause near syncope. Your healthcare providers can help you create a plan to manage near syncope and prevent episodes.    DISCHARGE INSTRUCTIONS:    Call your local emergency department (911 in the [place]) or have someone call if:    You have sudden chest pain.    You have trouble breathing or shortness of breath.  Return to the emergency department if:    You have vision changes, are sweating, and have nausea while you are sitting or lying down.    You feel dizzy or flushed and your heart is fluttering.    You lose consciousness.    You cannot use your arm, hand, foot, or leg, or it feels weak.    You have trouble speaking or understanding others when they speak.  Call your doctor if:    You have new or worsening symptoms.    Your heart beats faster or slower than usual.    You have questions or concerns about your condition or care.  Medicines:    Medicines may be needed to help your heart pump strongly and regularly. Your healthcare provider may also make changes to any medicines that are causing episodes.    Take your medicine as directed. Contact your healthcare provider if you think your medicine is not helping or if you have side effects. Tell your provider if you are allergic to any medicine. Keep a list of the medicines, vitamins, and herbs you take. Include the amounts, and when and why you take them. Bring the list or the pill bottles to follow-up visits. Carry your medicine list with you in case of an emergency.  Manage near syncope:    Sit or lie down when needed. This includes when you feel dizzy, your throat is getting tight, and your vision changes. Raise your legs above the level of your heart.    Take slow, deep breaths if you start to breathe faster with anxiety or fear. This can help decrease dizziness and the feeling that you might faint.    Keep a record of your near syncope episodes. Include your symptoms and your activity before and after the episode. The record can help your healthcare provider find the cause of your near syncope and help you manage episodes.  Prevent a near syncope episode:    Know and avoid your triggers. Certain events may bring on episodes. These events may cause you to feel under pressure, upset, or fearful. When you feel the symptoms, you can make movements to prevent an episode. For example, make a fist, cross your legs, squeeze your thighs together, or tighten your arm muscles.    Move slowly and let yourself get used to one position before you move to another position. This is very important when you change from a lying or sitting position to a standing position. Take some deep breaths before you stand up from a lying position. Stand up slowly. Sudden movements may cause a fainting spell. Sit on the side of the bed or couch for a few minutes before you stand up. If you are on bedrest, try to be upright for about 2 hours each day, or as directed. Do not lock your legs if you are standing for a long period of time. Move your legs and bend your knees to keep blood flowing.    Follow your healthcare provider's recommendations. Your provider may recommend that you drink more liquids to prevent dehydration. You may also need to have more salt to keep your blood pressure from dropping too low and causing syncope. Your provider will tell you how much liquid and sodium to have each day. Your provider will also tell you how much physical activity is safe for you. This will depend on what is causing your near syncope.    Watch for signs of low blood sugar. These include hunger, nervousness, sweating, and fast or fluttery heartbeats. Talk with your provider about ways to keep your blood sugar level steady.    Check your blood pressure often. This is important if you take medicine to lower your blood pressure. Check your blood pressure when you are lying down and when you are standing. Ask how often to check during the day. Keep a record of your blood pressure numbers. Your provider may use the record to help plan your treatment.    Do not strain if you are constipated. You may faint if you strain to have a bowel movement. Walking is the best way to get your bowels moving. Eat foods high in fiber to make it easier to have a bowel movement. Good examples are high-fiber cereals, beans, vegetables, and whole-grain breads. Prune juice may help make bowel movements softer.    Do not exercise outside on a hot day. The combination of physical activity and heat can lead to dehydration. This can cause syncope.  Follow up with your doctor as directed: You may need more tests to help find the cause of your near syncope episodes. The tests will help healthcare providers plan the best treatment for you. Write down your questions so you remember to ask them during your visits.

## 2024-11-14 NOTE — ED ADULT TRIAGE NOTE - CHIEF COMPLAINT QUOTE
Son states: "Shes been falling a lot the past couple of months. Today, she had a blank stare and fell in my arms. She didn't hit her head. I caught her. Yesterday she also fell because her walker got caught in the elevator. She hit her head that's why she has bruising in her face."

## 2024-11-14 NOTE — ED ADULT NURSE NOTE - NSFALLHARMRISKINTERV_ED_ALL_ED

## 2024-11-14 NOTE — ED PROVIDER NOTE - PATIENT PORTAL LINK FT
You can access the FollowMyHealth Patient Portal offered by [facility] by registering at the following website: [email or web address]. By joining [employer]’s FollowMyHealth portal, you will also be able to view your health information using other applications (apps) compatible with our system.

## 2024-11-14 NOTE — ED ADULT NURSE NOTE - OBJECTIVE STATEMENT
BIBEMS from home, as per son, pt felt weak & fell, but did not hit head.  Son stated, " I caught her & eased her down to the floor." Pt denies any complains.  Pt noted with multiple facial bruising, as per pt, she fell Monday

## 2024-11-14 NOTE — ED PROVIDER NOTE - PROGRESS NOTE DETAILS
pt awake, fluent speech, no dizziness, no chest pain, no vomiting. would like to go home.. recommend f/u with PMD  I have discussed the discharge plan with the patient. The patient agrees with the plan, as discussed.  The patient understands Emergency Department diagnosis is a preliminary diagnosis often based on limited information and that the patient must adhere to the follow-up plan as discussed.  The patient understands that if the symptoms worsen the patient may return to the Emergency Department at any time for further evaluation and treatment.

## 2024-11-14 NOTE — ED PROVIDER NOTE - CLINICAL SUMMARY MEDICAL DECISION MAKING FREE TEXT BOX
89 F hx of htn, on aspirin, seen here 3 days ago s/p fall in elevator with head strike. CT imaging neg then. Here today bc son was helping her get to living room with walker when she sort of slumped in his arms and passed out. Pt now without any complaints. Did not fall or hit head today.     diff dx ACS, anemia, metabolic derangements, post concussive sxs  plan: labs, rpt CTH

## 2024-11-15 VITALS
RESPIRATION RATE: 16 BRPM | DIASTOLIC BLOOD PRESSURE: 88 MMHG | TEMPERATURE: 98 F | HEART RATE: 73 BPM | OXYGEN SATURATION: 98 % | SYSTOLIC BLOOD PRESSURE: 166 MMHG

## 2024-11-18 DIAGNOSIS — Z79.82 LONG TERM (CURRENT) USE OF ASPIRIN: ICD-10-CM

## 2024-11-18 DIAGNOSIS — R55 SYNCOPE AND COLLAPSE: ICD-10-CM
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
## 2024-11-18 DIAGNOSIS — I10 ESSENTIAL (PRIMARY) HYPERTENSION: ICD-10-CM

## 2024-11-18 DIAGNOSIS — S00.83XA CONTUSION OF OTHER PART OF HEAD, INITIAL ENCOUNTER: ICD-10-CM

## 2024-11-18 LAB
ALBUMIN SERPL ELPH-MCNC: 4.2 G/DL
ANION GAP SERPL CALC-SCNC: 17 MMOL/L
APPEARANCE: ABNORMAL
BACTERIA: NEGATIVE /HPF
BILIRUBIN URINE: NEGATIVE
BLOOD URINE: NEGATIVE
BUN SERPL-MCNC: 36 MG/DL
CALCIUM SERPL-MCNC: 10.2 MG/DL
CAST: 16 /LPF
CHLORIDE SERPL-SCNC: 108 MMOL/L
CO2 SERPL-SCNC: 20 MMOL/L
COARSE GRANULAR CASTS: PRESENT
COLOR: YELLOW
CREAT SERPL-MCNC: 3.69 MG/DL
CYSTATIN C SERPL-MCNC: 3.73 MG/L
EGFR: 11 ML/MIN/1.73M2
EPITHELIAL CELLS: 7 /HPF
GFR/BSA.PRED SERPLBLD CYS-BASED-ARV: 11 ML/MIN/1.73M2
GLUCOSE QUALITATIVE U: 100 MG/DL
GLUCOSE SERPL-MCNC: 107 MG/DL
HCT VFR BLD CALC: 40.8 %
HGB BLD-MCNC: 13 G/DL
HYALINE CASTS: PRESENT
KETONES URINE: ABNORMAL MG/DL
LEUKOCYTE ESTERASE URINE: ABNORMAL
MCHC RBC-ENTMCNC: 30.6 PG
MCHC RBC-ENTMCNC: 31.9 G/DL
MCV RBC AUTO: 96 FL
MICROSCOPIC-UA: NORMAL
NITRITE URINE: NEGATIVE
PH URINE: 5.5
PHOSPHATE SERPL-MCNC: 3.5 MG/DL
PLATELET # BLD AUTO: 350 K/UL
POTASSIUM SERPL-SCNC: 4.2 MMOL/L
PROTEIN URINE: 300 MG/DL
RBC # BLD: 4.25 M/UL
RBC # FLD: 13.2 %
RED BLOOD CELLS URINE: 1 /HPF
REVIEW: NORMAL
SODIUM SERPL-SCNC: 145 MMOL/L
SPECIFIC GRAVITY URINE: 1.02
UROBILINOGEN URINE: 1 MG/DL
WBC # FLD AUTO: 9.7 K/UL
WHITE BLOOD CELLS URINE: 6 /HPF

## 2024-11-23 ENCOUNTER — EMERGENCY (EMERGENCY)
Facility: HOSPITAL | Age: 89
LOS: 1 days | Discharge: HOME | End: 2024-11-23
Attending: EMERGENCY MEDICINE | Admitting: EMERGENCY MEDICINE
Payer: MEDICARE

## 2024-11-23 VITALS
OXYGEN SATURATION: 96 % | SYSTOLIC BLOOD PRESSURE: 125 MMHG | HEART RATE: 64 BPM | RESPIRATION RATE: 20 BRPM | DIASTOLIC BLOOD PRESSURE: 61 MMHG | WEIGHT: 149.91 LBS | TEMPERATURE: 98 F | HEIGHT: 62 IN

## 2024-11-23 VITALS
OXYGEN SATURATION: 96 % | RESPIRATION RATE: 19 BRPM | DIASTOLIC BLOOD PRESSURE: 85 MMHG | TEMPERATURE: 98 F | HEART RATE: 66 BPM | SYSTOLIC BLOOD PRESSURE: 129 MMHG

## 2024-11-23 LAB
ANION GAP SERPL CALC-SCNC: 12 MMOL/L — SIGNIFICANT CHANGE UP (ref 5–17)
BASOPHILS # BLD AUTO: 0.03 K/UL — SIGNIFICANT CHANGE UP (ref 0–0.2)
BASOPHILS NFR BLD AUTO: 0.4 % — SIGNIFICANT CHANGE UP (ref 0–2)
BUN SERPL-MCNC: 49 MG/DL — HIGH (ref 7–23)
CALCIUM SERPL-MCNC: 8.3 MG/DL — LOW (ref 8.4–10.5)
CHLORIDE SERPL-SCNC: 109 MMOL/L — HIGH (ref 96–108)
CO2 SERPL-SCNC: 19 MMOL/L — LOW (ref 22–31)
CREAT SERPL-MCNC: 5.08 MG/DL — HIGH (ref 0.5–1.3)
EGFR: 8 ML/MIN/1.73M2 — LOW
EOSINOPHIL # BLD AUTO: 0.29 K/UL — SIGNIFICANT CHANGE UP (ref 0–0.5)
EOSINOPHIL NFR BLD AUTO: 3.7 % — SIGNIFICANT CHANGE UP (ref 0–6)
GLUCOSE SERPL-MCNC: 176 MG/DL — HIGH (ref 70–99)
HCT VFR BLD CALC: 31.9 % — LOW (ref 34.5–45)
HGB BLD-MCNC: 10.1 G/DL — LOW (ref 11.5–15.5)
IMM GRANULOCYTES NFR BLD AUTO: 0.6 % — SIGNIFICANT CHANGE UP (ref 0–0.9)
LYMPHOCYTES # BLD AUTO: 1.3 K/UL — SIGNIFICANT CHANGE UP (ref 1–3.3)
LYMPHOCYTES # BLD AUTO: 16.7 % — SIGNIFICANT CHANGE UP (ref 13–44)
MAGNESIUM SERPL-MCNC: 2 MG/DL — SIGNIFICANT CHANGE UP (ref 1.6–2.6)
MCHC RBC-ENTMCNC: 30.2 PG — SIGNIFICANT CHANGE UP (ref 27–34)
MCHC RBC-ENTMCNC: 31.7 G/DL — LOW (ref 32–36)
MCV RBC AUTO: 95.5 FL — SIGNIFICANT CHANGE UP (ref 80–100)
MONOCYTES # BLD AUTO: 0.61 K/UL — SIGNIFICANT CHANGE UP (ref 0–0.9)
MONOCYTES NFR BLD AUTO: 7.8 % — SIGNIFICANT CHANGE UP (ref 2–14)
NEUTROPHILS # BLD AUTO: 5.51 K/UL — SIGNIFICANT CHANGE UP (ref 1.8–7.4)
NEUTROPHILS NFR BLD AUTO: 70.8 % — SIGNIFICANT CHANGE UP (ref 43–77)
NRBC # BLD: 0 /100 WBCS — SIGNIFICANT CHANGE UP (ref 0–0)
PLATELET # BLD AUTO: 283 K/UL — SIGNIFICANT CHANGE UP (ref 150–400)
POTASSIUM SERPL-MCNC: 4.1 MMOL/L — SIGNIFICANT CHANGE UP (ref 3.5–5.3)
POTASSIUM SERPL-SCNC: 4.1 MMOL/L — SIGNIFICANT CHANGE UP (ref 3.5–5.3)
RBC # BLD: 3.34 M/UL — LOW (ref 3.8–5.2)
RBC # FLD: 12.7 % — SIGNIFICANT CHANGE UP (ref 10.3–14.5)
SODIUM SERPL-SCNC: 140 MMOL/L — SIGNIFICANT CHANGE UP (ref 135–145)
TROPONIN T, HIGH SENSITIVITY RESULT: 52 NG/L — CRITICAL HIGH (ref 0–51)
WBC # BLD: 7.79 K/UL — SIGNIFICANT CHANGE UP (ref 3.8–10.5)
WBC # FLD AUTO: 7.79 K/UL — SIGNIFICANT CHANGE UP (ref 3.8–10.5)

## 2024-11-23 PROCEDURE — 80048 BASIC METABOLIC PNL TOTAL CA: CPT

## 2024-11-23 PROCEDURE — 36415 COLL VENOUS BLD VENIPUNCTURE: CPT

## 2024-11-23 PROCEDURE — 99285 EMERGENCY DEPT VISIT HI MDM: CPT | Mod: 25

## 2024-11-23 PROCEDURE — 70450 CT HEAD/BRAIN W/O DYE: CPT | Mod: 26,MC

## 2024-11-23 PROCEDURE — 93005 ELECTROCARDIOGRAM TRACING: CPT

## 2024-11-23 PROCEDURE — 82962 GLUCOSE BLOOD TEST: CPT

## 2024-11-23 PROCEDURE — 83735 ASSAY OF MAGNESIUM: CPT

## 2024-11-23 PROCEDURE — 99285 EMERGENCY DEPT VISIT HI MDM: CPT

## 2024-11-23 PROCEDURE — 85025 COMPLETE CBC W/AUTO DIFF WBC: CPT

## 2024-11-23 PROCEDURE — 70450 CT HEAD/BRAIN W/O DYE: CPT | Mod: MC

## 2024-11-23 PROCEDURE — 84484 ASSAY OF TROPONIN QUANT: CPT

## 2024-11-23 NOTE — ED PROVIDER NOTE - NSFOLLOWUPINSTRUCTIONS_ED_ALL_ED_FT
Reduce your carvedilol dose to 12.5mg twice daily.   Follow up with Dr. Driver, your cardiologist and neurologist for re-evaluation and further work up including cardiac monitoring device and repeat eeg.   Return to er for any new or worsening symptoms.     Syncope, Adult  Outline of the head showing blood vessels that supply the brain.  Syncope is when you pass out or faint for a short time. It is caused by a sudden decrease in blood flow to the brain. This can happen for many reasons. It can sometimes happen when seeing blood, getting a shot (injection), or having pain or strong emotions. Most causes of fainting are not dangerous, but in some cases it can be a sign of a serious medical problem.    If you faint, get help right away. Call your local emergency services (911 in the [place]    Follow these instructions at home:  Watch for any changes in your symptoms. Take these actions to stay safe and help with your symptoms:    Knowing when you may be about to faint    Signs that you may be about to faint include:  Feeling dizzy or light-headed. It may feel like the room is spinning.  Feeling weak.  Feeling like you may vomit (nauseous).  Seeing spots or seeing all white or all black.  Having cold, clammy skin.  Feeling warm and sweaty.  Hearing ringing in the ears.  If you start to feel like you might faint, sit or lie down right away. If sitting, lower your head down between your legs. If lying down, raise (elevate) your feet above the level of your heart.  Breathe deeply and steadily. Wait until all of the symptoms are gone.  Have someone stay with you until you feel better.  Medicines    Take over-the-counter and prescription medicines only as told by your doctor.  If you are taking blood pressure or heart medicine, sit up and stand up slowly. Spend a few minutes getting ready to sit and then stand. This can help you feel less dizzy.  Lifestyle    Do not drive, use machinery, or play sports until your doctor says it is okay.  Do not drink alcohol.  Do not smoke or use any products that contain nicotine or tobacco. If you need help quitting, ask your doctor.  Avoid hot tubs and saunas.  General instructions    Talk with your doctor about your symptoms. You may need to have testing to help find the cause.  Drink enough fluid to keep your pee (urine) pale yellow.  Avoid standing for a long time. If you must stand for a long time, do movements such as:  Moving your legs.  Crossing your legs.  Flexing and stretching your leg muscles.  Squatting.  Keep all follow-up visits.  Contact a doctor if:  You have episodes of near fainting.  Get help right away if:  You pass out or faint.  You hit your head or are injured after fainting.  You have any of these symptoms:  Fast or uneven heartbeats (palpitations).  Pain in your chest, belly, or back.  Shortness of breath.  You have jerky movements that you cannot control (seizure).  You have a very bad headache.  You are confused.  You have problems with how you see (vision).  You are very weak.  You have trouble walking.  You are bleeding from your mouth or your butt (rectum).  You have black or tarry poop (stool).  These symptoms may be an emergency. Get help right away. Call your local emergency services (911 in the [place]  Do not wait to see if the symptoms will go away.  Do not drive yourself to the hospital.  Summary  Syncope is when you pass out or faint for a short time. It is caused by a sudden decrease in blood flow to the brain.  Signs that you may be about to faint include feeling dizzy or light-headed, feeling like you may vomit, seeing all white or all black, or having cold, clammy skin.  If you start to feel like you might faint, sit or lie down right away. Lower your head if sitting, or raise (elevate) your feet if lying down. Breathe deeply and steadily. Wait until all of the symptoms are gone.  This information is not intended to replace advice given to you by your health care provider. Make sure you discuss any questions you have with your health care provider.

## 2024-11-23 NOTE — ED ADULT NURSE NOTE - NSFALLHARMRISKINTERV_ED_ALL_ED

## 2024-11-23 NOTE — ED PROVIDER NOTE - OBJECTIVE STATEMENT
89-year-old woman with hypertension, hyperlipidemia, hypothyroidism presented  as a stroke alert. At initial presentation, she was unresponsive. She  subsequently started to respond. Initial differential included stroke as well  as possible large vessel occlusion. CT head, CTA head and neck, and CT  perfusion obtained. No acute stroke. No evidence of LVO. No perfusion  mismatch. Following the CT of the head, she was markedly improved. Awake and  oriented. Speaking fluently. Able to lift arms and legs against gravity  equally, minimal dysarthria but otherwise neurologically intact. (NIHSS=1).  Thus, TNK not indicated as no significant deficit and patient would also  present a significant risk given multiple recent falls and visible ecchymoses.  Also no indication for interventional treatment. The imaging was reviewed with  neuroradiology, the ED staff, and the patient's son. The patient was also  examined multiple times to determine the progression of the deficits and then  stability of improvement.    Assessment:  Unresponsive episode. Differential: Syncope on neurocardiogenic or cardiogenic  basis, metabolic derangement, systemic infection. TIA or stroke less likely.  No observed clinical symptoms for seizures.        Head CT 11/17/24: Foci of hypodensity in the left thalamus may represent  age-indeterminate infarcts.  Correlate with MRI brain.    CTA H/N 11/17/24: Left proximal cervical ICA stenosis measuring 70%.  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  No intracranial large vessel occlusion, aneurysm, or vascular  malformation.    CTP 11/17/24: No perfusion abnormality to suggest acute infarct or  penumbra/ischemia. Possible foci of oligemia in the bilateral parietal,  occipital, and left frontal lobes.    Assessment: 89 year old female with pmhx of HTN, HLD, hypothyroidism  presents to [facility] ED as a stroke code for episode of  unresponsiveness at home. Initial NIH 22. Post CTH, patient's exam  markedly improved. NIH 1. TNK not given as patient with recent  falls/trauma and absence of focality on exam. Patient's exam also rapidly  improving. Not a candidate for thrombectomy as no LVO seen on imaging. Pt is an 89yo f, h/o hypertension, hyperlipidemia, hypothyroidism, s/p fall on 11/11/24 w/ neg CTH, CT MFB and CT C-spine, seen again on 11/14/24 for syncope w/ neg w/u including rpt head ct, trop 33->35, then admitted to [facility] on 11/17/24 for episode of unresponsiveness, was a stroke code with CT head, CTA head and neck, and CTP neg for stroke, LVO, perfusion mismatch. While inpt, pt underwent EEG which was neg for sz, had MR brain (neg for cva), echo (normal as per pt's son). Pt presents today with another episode of unresponsiveness.

## 2024-11-23 NOTE — ED PROVIDER NOTE - CLINICAL SUMMARY MEDICAL DECISION MAKING FREE TEXT BOX
Impression: 89yo f, h/o hypertension, hyperlipidemia, hypothyroidism, s/p fall on 11/11/24 w/ neg CTH, CT MFB and CT C-spine, seen again on 11/14/24 for syncope w/ neg w/u including rpt head ct, trop 33->35, then admitted to [facility] on 11/17/24 for episode of unresponsiveness, was a stroke code with CT head, CTA head and neck, and CTP neg for stroke, LVO, perfusion mismatch. While inpt, pt underwent EEG which was neg for sz, had MR brain (neg for cva), echo (normal as per pt's son). Pt presents today with another episode of unresponsiveness.     Afebrile. HDS. Pt is at baseline mental status, neuro exam non-focal. As per ems, syst bp low at 103 bpm in field, and improved en route to ED. No leukocytosis with mild anemia. + acute on chronic kidney dz w/ Cr 5.08 (was 3.52 on 11/14/24). Trop also elev to 52 (was 35 on 11/14/24, may be related to worsening renal function). No stemi on ekg. No cp, sob. CT head neg for acute bleed/ infarct. Will obtain rpt trop to trend values. Case d/w Dr. Driver; ? episode of unreponsivness related to low bp. Pt to reduce coreg dose from 50mg bid to 12.5mg bid. If trop remains same/ improved, plan for dc home w/ outpt event recorder and rpt eeg. Will continue to monitor. Impression: 88yo f, h/o hypertension, hyperlipidemia, hypothyroidism, s/p fall on 11/11/24 w/ neg CTH, CT MFB and CT C-spine, seen again on 11/14/24 for syncope w/ neg w/u including rpt head ct, trop 33->35, then admitted to [facility] on 11/17/24 for episode of unresponsiveness, was a stroke code with CT head, CTA head and neck, and CTP neg for stroke, LVO, perfusion mismatch. While inpt, pt underwent EEG which was neg for sz, had MR brain (neg for cva), echo (normal as per pt's son). Pt presents today with another episode of unresponsiveness.     Afebrile. HDS. Pt is at baseline mental status, neuro exam non-focal. As per ems, syst bp low at 103 bpm in field, and improved en route to ED. No leukocytosis with mild anemia. + acute on chronic kidney dz w/ Cr 5.08 (was 3.52 on 11/14/24). Trop also elev to 52 (was 35 on 11/14/24, may be related to worsening renal function). No stemi on ekg. No cp, sob. CT head neg for acute bleed/ infarct. Will obtain rpt trop to trend values. Case d/w Dr. Driver; ? episode of unreponsivness related to low bp. Pt to reduce coreg dose from 50mg bid to 12.5mg bid. If trop remains same/ improved, plan for dc home w/ outpt event recorder and rpt eeg. Will continue to monitor.    Repeat trop remains the same at 52. Pt continuing to deny any cp. Doubt acs. ED evaluation and management discussed with the patient and son in detail.  Close PMD, cards and neuro follow up encouraged.  Strict ED return instructions discussed in detail and patient given the opportunity to ask any questions about their discharge diagnosis and instructions. Patient verbalized understanding.

## 2024-11-23 NOTE — ED PROVIDER NOTE - CARE PROVIDER_API CALL
Neva [facility]att [place]  [facility]  [address]  [place] [address]  Phone: [phone]  Fax: [phone]  Follow Up Time:

## 2024-11-23 NOTE — ED PROVIDER NOTE - PATIENT PORTAL LINK FT
You can access the FollowMyHealth Patient Portal offered by [facility] by registering at the following website: [email or web address]. By joining [employer]’s FollowMyHealth portal, you will also be able to view your health information using other applications (apps) compatible with our system.

## 2024-11-23 NOTE — ED ADULT NURSE NOTE - CHIEF COMPLAINT QUOTE
Son reports they were having lunch and patient stopped responding for about 15 minutes, pt was awake and sitting in chair but was not talking, pt is currently back to baseline.

## 2024-11-23 NOTE — ED ADULT NURSE REASSESSMENT NOTE - NS ED NURSE REASSESS COMMENT FT1
Attempted to put patient in yellow gown and red socks d/t high fall risk, pt son intervened and endorsed "Do not agitate her, she does not need to change". MD Ree notified and aware. Pt in regular clothes at this time.

## 2024-11-23 NOTE — ED ADULT TRIAGE NOTE - BP NONINVASIVE SYSTOLIC (MM HG)
125 Fusiform Excision Additional Text (Leave Blank If You Do Not Want): The margin was drawn around the clinically apparent lesion.  A fusiform shape was then drawn on the skin incorporating the lesion and margins.  Incisions were then made along these lines to the appropriate tissue plane and the lesion was extirpated.

## 2024-11-23 NOTE — ED ADULT TRIAGE NOTE - CHIEF COMPLAINT QUOTE
Son reports they were having lunch and patient stopped responding for about 15 minutes, pt was awake and sitting in chair but was not talking, pt is currently back to baseline. 89F with HTN, hypothyroidism, dementia, came from SNF due to AMS, found to have hypoglycemia.     Pt failed swallow evaluation on 9/22 but noted "if more awake, can trial a conservative diet (puree) if patient able to tolerate until further recs by SLP team, if patient is lethargic then will only allow for meds with applesauce" per MD note. Awaiting recs from SLP s/p follow up today. NFPE with evidence of severe/moderate muscle wasting/fat loss. Weight obtained from [facility] records, 109lbs.

## 2024-11-23 NOTE — ED ADULT NURSE NOTE - OBJECTIVE STATEMENT
Patient presents to the ED with son, complaining of syncopal episode today where she was not responding for 15 mins. Son reports that patient had a fall recently and was seen at a different hospital. Son also states that the patient's blood pressure has been low.

## 2024-11-23 NOTE — ED PROVIDER NOTE - PHYSICAL EXAMINATION
VITAL SIGNS: I have reviewed nursing notes and confirm.  CONSTITUTIONAL: Well appearing, in no acute distress.   SKIN:  warm and dry, no acute rash. Ecchymosis to face from prior fall.   HEAD:  normocephalic, atraumatic.  EYES: EOM intact; conjunctiva and sclera clear.  ENT: No nasal discharge; airway clear.   NECK: Supple.  CARD: S1, S2, Regular rate and rhythm.   RESP:  Clear to auscultation b/l, no wheezes, rales or rhonchi.  ABD: Normal bowel sounds; soft; non-distended; non-tender; no guarding/ rebound.  EXT: Normal ROM. No peripheral edema. Pulses intact and equal b/l.  NEURO: Alert, oriented, at baseline mental status, CN II-XII grossly intact. Motor/ sensation intact and equal b/l. Neg pronator drift. Speech clear and fluent.

## 2024-11-26 DIAGNOSIS — E03.9 HYPOTHYROIDISM, UNSPECIFIED: ICD-10-CM

## 2024-11-26 DIAGNOSIS — D64.9 ANEMIA, UNSPECIFIED: ICD-10-CM

## 2024-11-26 DIAGNOSIS — R40.4 TRANSIENT ALTERATION OF AWARENESS: ICD-10-CM

## 2024-11-26 DIAGNOSIS — I12.9 HYPERTENSIVE CHRONIC KIDNEY DISEASE WITH STAGE 1 THROUGH STAGE 4 CHRONIC KIDNEY DISEASE, OR UNSPECIFIED CHRONIC KIDNEY DISEASE: ICD-10-CM

## 2024-12-03 ENCOUNTER — APPOINTMENT (OUTPATIENT)
Dept: NEPHROLOGY | Facility: CLINIC | Age: 89
End: 2024-12-03

## 2024-12-04 ENCOUNTER — APPOINTMENT (OUTPATIENT)
Dept: NEPHROLOGY | Facility: CLINIC | Age: 89
End: 2024-12-04
Payer: MEDICARE

## 2024-12-04 VITALS — HEART RATE: 61 BPM | DIASTOLIC BLOOD PRESSURE: 69 MMHG | SYSTOLIC BLOOD PRESSURE: 139 MMHG

## 2024-12-04 DIAGNOSIS — R31.29 OTHER MICROSCOPIC HEMATURIA: ICD-10-CM

## 2024-12-04 DIAGNOSIS — R80.9 PROTEINURIA, UNSPECIFIED: ICD-10-CM

## 2024-12-04 DIAGNOSIS — N18.5 CHRONIC KIDNEY DISEASE, STAGE 5: ICD-10-CM

## 2024-12-04 DIAGNOSIS — N25.81 SECONDARY HYPERPARATHYROIDISM OF RENAL ORIGIN: ICD-10-CM

## 2024-12-04 DIAGNOSIS — E11.9 TYPE 2 DIABETES MELLITUS W/OUT COMPLICATIONS: ICD-10-CM

## 2024-12-04 DIAGNOSIS — E83.39 OTHER DISORDERS OF PHOSPHORUS METABOLISM: ICD-10-CM

## 2024-12-04 DIAGNOSIS — I10 ESSENTIAL (PRIMARY) HYPERTENSION: ICD-10-CM

## 2024-12-04 PROCEDURE — 99215 OFFICE O/P EST HI 40 MIN: CPT

## 2024-12-04 PROCEDURE — G2211 COMPLEX E/M VISIT ADD ON: CPT

## 2025-01-03 ENCOUNTER — RX RENEWAL (OUTPATIENT)
Age: 89
End: 2025-01-03

## 2025-01-20 NOTE — ED ADULT NURSE NOTE - SUICIDE SCREENING QUESTION 3
Freestyle [place] 3 Sensor Approved    Filling Pharmacy: [facility]     Freestyle [place] Pending    Insurance response  Prescription Drug Insurance: [employer]  Notes: Prior authorization submitted - will update provider when decision has been made by insurance.        Pharmacy faxed in a request for prior authorization on:    Medication: Freestyle [place] 3 sensor   Dosage: 1 every 14 days   Quantity requested:  2  Pharmacy for prescription has been selected.    Initiation of prior authorization needed.   No normal

## 2025-02-19 ENCOUNTER — APPOINTMENT (OUTPATIENT)
Dept: NEPHROLOGY | Facility: CLINIC | Age: 89
End: 2025-02-19
Payer: MEDICARE

## 2025-02-19 VITALS — HEART RATE: 64 BPM | DIASTOLIC BLOOD PRESSURE: 67 MMHG | SYSTOLIC BLOOD PRESSURE: 136 MMHG

## 2025-02-19 DIAGNOSIS — N18.5 CHRONIC KIDNEY DISEASE, STAGE 5: ICD-10-CM

## 2025-02-19 DIAGNOSIS — E83.39 OTHER DISORDERS OF PHOSPHORUS METABOLISM: ICD-10-CM

## 2025-02-19 DIAGNOSIS — N25.81 SECONDARY HYPERPARATHYROIDISM OF RENAL ORIGIN: ICD-10-CM

## 2025-02-19 DIAGNOSIS — E87.5 HYPERKALEMIA: ICD-10-CM

## 2025-02-19 DIAGNOSIS — I10 ESSENTIAL (PRIMARY) HYPERTENSION: ICD-10-CM

## 2025-02-19 PROCEDURE — 99214 OFFICE O/P EST MOD 30 MIN: CPT

## 2025-02-19 PROCEDURE — G2211 COMPLEX E/M VISIT ADD ON: CPT

## 2025-02-24 NOTE — ED ADULT NURSE NOTE - MODE OF DISCHARGE
Detail Level: Generalized
Sunscreen Recommendations: Blue Lizard SPF 30
Detail Level: Detailed
Detail Level: Simple
Ambulatory with cane/crutches/walker

## 2025-02-26 ENCOUNTER — APPOINTMENT (OUTPATIENT)
Dept: ENDOCRINOLOGY | Facility: CLINIC | Age: 89
End: 2025-02-26
Payer: MEDICARE

## 2025-02-26 VITALS
WEIGHT: 153 LBS | DIASTOLIC BLOOD PRESSURE: 71 MMHG | SYSTOLIC BLOOD PRESSURE: 152 MMHG | HEART RATE: 68 BPM | HEIGHT: 60 IN | BODY MASS INDEX: 30.04 KG/M2

## 2025-02-26 DIAGNOSIS — E55.9 VITAMIN D DEFICIENCY, UNSPECIFIED: ICD-10-CM

## 2025-02-26 DIAGNOSIS — Z82.49 FAMILY HISTORY OF ISCHEMIC HEART DISEASE AND OTHER DISEASES OF THE CIRCULATORY SYSTEM: ICD-10-CM

## 2025-02-26 DIAGNOSIS — E03.9 HYPOTHYROIDISM, UNSPECIFIED: ICD-10-CM

## 2025-02-26 DIAGNOSIS — E11.9 TYPE 2 DIABETES MELLITUS W/OUT COMPLICATIONS: ICD-10-CM

## 2025-02-26 DIAGNOSIS — Z13.820 ENCOUNTER FOR SCREENING FOR OSTEOPOROSIS: ICD-10-CM

## 2025-02-26 LAB
GLUCOSE BLDC GLUCOMTR-MCNC: 136
HBA1C MFR BLD HPLC: 5.7

## 2025-02-26 PROCEDURE — 99204 OFFICE O/P NEW MOD 45 MIN: CPT

## 2025-02-26 PROCEDURE — G2211 COMPLEX E/M VISIT ADD ON: CPT

## 2025-02-26 PROCEDURE — 36415 COLL VENOUS BLD VENIPUNCTURE: CPT

## 2025-02-27 LAB
25(OH)D3 SERPL-MCNC: 32.7 NG/ML
CREAT SPEC-SCNC: 162 MG/DL
FRUCTOSAMINE SERPL-MCNC: 272 UMOL/L
MICROALBUMIN 24H UR DL<=1MG/L-MCNC: 109.6 MG/DL
MICROALBUMIN/CREAT 24H UR-RTO: 676 MG/G
T4 FREE SERPL-MCNC: 1.5 NG/DL
TSH SERPL-ACNC: 0.51 UIU/ML

## 2025-03-04 ENCOUNTER — APPOINTMENT (OUTPATIENT)
Dept: RADIOLOGY | Facility: CLINIC | Age: 89
End: 2025-03-04
Payer: MEDICARE

## 2025-03-04 PROCEDURE — 77085 DXA BONE DENSITY AXL VRT FX: CPT

## 2025-03-25 ENCOUNTER — APPOINTMENT (OUTPATIENT)
Dept: ENDOCRINOLOGY | Facility: CLINIC | Age: 89
End: 2025-03-25

## 2025-04-09 DIAGNOSIS — M81.0 AGE-RELATED OSTEOPOROSIS W/OUT CURRENT PATHOLOGICAL FRACTURE: ICD-10-CM

## 2025-04-11 ENCOUNTER — NON-APPOINTMENT (OUTPATIENT)
Age: 89
End: 2025-04-11

## 2025-04-25 ENCOUNTER — NON-APPOINTMENT (OUTPATIENT)
Age: 89
End: 2025-04-25

## 2025-05-19 ENCOUNTER — APPOINTMENT (OUTPATIENT)
Dept: ENDOCRINOLOGY | Facility: CLINIC | Age: 89
End: 2025-05-19
Payer: MEDICARE

## 2025-05-19 ENCOUNTER — NON-APPOINTMENT (OUTPATIENT)
Age: 89
End: 2025-05-19

## 2025-05-19 VITALS
WEIGHT: 153 LBS | DIASTOLIC BLOOD PRESSURE: 69 MMHG | BODY MASS INDEX: 29.88 KG/M2 | SYSTOLIC BLOOD PRESSURE: 158 MMHG | HEART RATE: 78 BPM

## 2025-05-19 DIAGNOSIS — E03.9 HYPOTHYROIDISM, UNSPECIFIED: ICD-10-CM

## 2025-05-19 DIAGNOSIS — M81.0 AGE-RELATED OSTEOPOROSIS W/OUT CURRENT PATHOLOGICAL FRACTURE: ICD-10-CM

## 2025-05-19 DIAGNOSIS — E11.9 TYPE 2 DIABETES MELLITUS W/OUT COMPLICATIONS: ICD-10-CM

## 2025-05-19 LAB
GLUCOSE BLDC GLUCOMTR-MCNC: 111
HBA1C MFR BLD HPLC: 5.4

## 2025-05-19 PROCEDURE — 99214 OFFICE O/P EST MOD 30 MIN: CPT

## 2025-05-19 PROCEDURE — G2211 COMPLEX E/M VISIT ADD ON: CPT

## 2025-05-19 PROCEDURE — 82962 GLUCOSE BLOOD TEST: CPT

## 2025-05-19 PROCEDURE — 83036 HEMOGLOBIN GLYCOSYLATED A1C: CPT | Mod: QW

## 2025-05-19 PROCEDURE — 36415 COLL VENOUS BLD VENIPUNCTURE: CPT

## 2025-05-21 LAB
T4 FREE SERPL-MCNC: 1.5 NG/DL
TSH SERPL-ACNC: 0.76 UIU/ML

## 2025-06-02 ENCOUNTER — APPOINTMENT (OUTPATIENT)
Dept: NEPHROLOGY | Facility: CLINIC | Age: 89
End: 2025-06-02

## 2025-06-12 ENCOUNTER — APPOINTMENT (OUTPATIENT)
Dept: NEPHROLOGY | Facility: CLINIC | Age: 89
End: 2025-06-12
Payer: MEDICARE

## 2025-06-12 PROCEDURE — G2211 COMPLEX E/M VISIT ADD ON: CPT

## 2025-06-12 PROCEDURE — 99214 OFFICE O/P EST MOD 30 MIN: CPT

## 2025-06-12 RX ORDER — VIT A AND D3 IN COD LIVER OIL 1250-135
CAPSULE ORAL
Refills: 0 | Status: ACTIVE | COMMUNITY

## 2025-06-13 VITALS — HEART RATE: 80 BPM | SYSTOLIC BLOOD PRESSURE: 148 MMHG | DIASTOLIC BLOOD PRESSURE: 68 MMHG

## 2025-07-22 ENCOUNTER — RX RENEWAL (OUTPATIENT)
Age: 89
End: 2025-07-22